# Patient Record
Sex: FEMALE | Race: WHITE | HISPANIC OR LATINO | Employment: PART TIME | URBAN - METROPOLITAN AREA
[De-identification: names, ages, dates, MRNs, and addresses within clinical notes are randomized per-mention and may not be internally consistent; named-entity substitution may affect disease eponyms.]

---

## 2017-02-28 ENCOUNTER — HOSPITAL ENCOUNTER (EMERGENCY)
Facility: MEDICAL CENTER | Age: 22
End: 2017-02-28
Attending: EMERGENCY MEDICINE

## 2017-02-28 VITALS
OXYGEN SATURATION: 98 % | HEART RATE: 89 BPM | WEIGHT: 253.09 LBS | DIASTOLIC BLOOD PRESSURE: 80 MMHG | BODY MASS INDEX: 40.67 KG/M2 | RESPIRATION RATE: 20 BRPM | TEMPERATURE: 98.1 F | HEIGHT: 66 IN | SYSTOLIC BLOOD PRESSURE: 160 MMHG

## 2017-02-28 DIAGNOSIS — J02.0 STREP THROAT: ICD-10-CM

## 2017-02-28 LAB
DEPRECATED S PYO AG THROAT QL EIA: ABNORMAL
SIGNIFICANT IND 70042: ABNORMAL
SITE SITE: ABNORMAL
SOURCE SOURCE: ABNORMAL

## 2017-02-28 PROCEDURE — 87880 STREP A ASSAY W/OPTIC: CPT

## 2017-02-28 PROCEDURE — 700102 HCHG RX REV CODE 250 W/ 637 OVERRIDE(OP): Performed by: EMERGENCY MEDICINE

## 2017-02-28 PROCEDURE — 99284 EMERGENCY DEPT VISIT MOD MDM: CPT

## 2017-02-28 PROCEDURE — A9270 NON-COVERED ITEM OR SERVICE: HCPCS | Performed by: EMERGENCY MEDICINE

## 2017-02-28 RX ORDER — AMOXICILLIN 250 MG/1
500 CAPSULE ORAL ONCE
Status: COMPLETED | OUTPATIENT
Start: 2017-02-28 | End: 2017-02-28

## 2017-02-28 RX ORDER — AMOXICILLIN 500 MG/1
500 CAPSULE ORAL 3 TIMES DAILY
Qty: 30 CAP | Refills: 0 | Status: SHIPPED | OUTPATIENT
Start: 2017-02-28 | End: 2017-03-10

## 2017-02-28 RX ORDER — IBUPROFEN 200 MG
400 TABLET ORAL EVERY 6 HOURS PRN
Status: SHIPPED | COMMUNITY
End: 2023-08-28

## 2017-02-28 RX ORDER — DEXAMETHASONE 4 MG/1
4 TABLET ORAL ONCE
Status: COMPLETED | OUTPATIENT
Start: 2017-02-28 | End: 2017-02-28

## 2017-02-28 RX ADMIN — DEXAMETHASONE 4 MG: 4 TABLET ORAL at 14:00

## 2017-02-28 RX ADMIN — AMOXICILLIN 500 MG: 250 CAPSULE ORAL at 14:00

## 2017-02-28 ASSESSMENT — PAIN SCALES - GENERAL: PAINLEVEL_OUTOF10: 10

## 2017-02-28 NOTE — ED NOTES
"Chief Complaint   Patient presents with   • Sore Throat     started last night   • Abdominal Pain     mid-epigastric, started last night     /85 mmHg  Pulse 89  Temp(Src) 36.7 °C (98.1 °F)  Resp 18  Ht 1.676 m (5' 5.98\")  Wt 114.8 kg (253 lb 1.4 oz)  BMI 40.87 kg/m2  SpO2 95%    "

## 2017-02-28 NOTE — ED AVS SNAPSHOT
[facility] Access Code: [email or web address]  Expires: 3/30/2017  1:31 PM    [facility]  A secure, online tool to manage your health information     [employer]*’s [facility]® is a secure, online tool that connects you to your personalized health information from the privacy of your home -- day or night - making it very easy for you to manage your healthcare. Once the activation process is completed, you can even access your medical information using the [facility] joesph, which is available for free in the Apple Joesph store or Google Play store.     [facility] provides the following levels of access (as shown below):   My Chart Features   [facility] Primary Care Doctor [facility]  Specialists [facility]  Urgent  Care Non-[facility]  Primary Care  Doctor   Email your healthcare team securely and privately 24/7 X X X X   Manage appointments: schedule your next appointment; view details of past/upcoming appointments X      Request prescription refills. X      View recent personal medical records, including lab and immunizations X X X X   View health record, including health history, allergies, medications X X X X   Read reports about your outpatient visits, procedures, consult and ER notes X X X X   See your discharge summary, which is a recap of your hospital and/or ER visit that includes your diagnosis, lab results, and care plan. X X       How to register for [facility]:  1. Go to  [email or web address].  2. Click on the Sign Up Now box, which takes you to the New Member Sign Up page. You will need to provide the following information:  a. Enter your [facility] Access Code exactly as it appears at the top of this page. (You will not need to use this code after you’ve completed the sign-up process. If you do not sign up before the expiration date, you must request a new code.)   b. Enter your date of birth.   c. Enter your home email address.   d. Click Submit, and follow the next screen’s instructions.  3. Create a [facility] ID. This will be your [facility]  login ID and cannot be changed, so think of one that is secure and easy to remember.  4. Create a [employer] password. You can change your password at any time.  5. Enter your Password Reset Question and Answer. This can be used at a later time if you forget your password.   6. Enter your e-mail address. This allows you to receive e-mail notifications when new information is available in [employer].  7. Click Sign Up. You can now view your health information.    For assistance activating your [employer] account, call [phone]

## 2017-02-28 NOTE — DISCHARGE INSTRUCTIONS
"Strep Throat  Strep throat is an infection of the throat caused by a bacteria named Streptococcus pyogenes. Your health care provider may call the infection streptococcal \"tonsillitis\" or \"pharyngitis\" depending on whether there are signs of inflammation in the tonsils or back of the throat. Strep throat is most common in children aged 5-15 years during the cold months of the year, but it can occur in people of any age during any season. This infection is spread from person to person (contagious) through coughing, sneezing, or other close contact.  SIGNS AND SYMPTOMS   · Fever or chills.  · Painful, swollen, red tonsils or throat.  · Pain or difficulty when swallowing.  · White or yellow spots on the tonsils or throat.  · Swollen, tender lymph nodes or \"glands\" of the neck or under the jaw.  · Red rash all over the body (rare).  DIAGNOSIS   Many different infections can cause the same symptoms. A test must be done to confirm the diagnosis so the right treatment can be given. A \"rapid strep test\" can help your health care provider make the diagnosis in a few minutes. If this test is not available, a light swab of the infected area can be used for a throat culture test. If a throat culture test is done, results are usually available in a day or two.  TREATMENT   Strep throat is treated with antibiotic medicine.  HOME CARE INSTRUCTIONS   · Gargle with 1 tsp of salt in 1 cup of warm water, 3-4 times per day or as needed for comfort.  · Family members who also have a sore throat or fever should be tested for strep throat and treated with antibiotics if they have the strep infection.  · Make sure everyone in your household washes their hands well.  · Do not share food, drinking cups, or personal items that could cause the infection to spread to others.  · You may need to eat a soft food diet until your sore throat gets better.  · Drink enough water and fluids to keep your urine clear or pale yellow. This will help prevent " dehydration.  · Get plenty of rest.  · Stay home from school, day care, or work until you have been on antibiotics for 24 hours.  · Take medicines only as directed by your health care provider.  · Take your antibiotic medicine as directed by your health care provider. Finish it even if you start to feel better.  SEEK MEDICAL CARE IF:   · The glands in your neck continue to enlarge.  · You develop a rash, cough, or earache.  · You cough up green, yellow-brown, or bloody sputum.  · You have pain or discomfort not controlled by medicines.  · Your problems seem to be getting worse rather than better.  · You have a fever.  SEEK IMMEDIATE MEDICAL CARE IF:   · You develop any new symptoms such as vomiting, severe headache, stiff or painful neck, chest pain, shortness of breath, or trouble swallowing.  · You develop severe throat pain, drooling, or changes in your voice.  · You develop swelling of the neck, or the skin on the neck becomes red and tender.  · You develop signs of dehydration, such as fatigue, dry mouth, and decreased urination.  · You become increasingly sleepy, or you cannot wake up completely.  MAKE SURE YOU:  · Understand these instructions.  · Will watch your condition.  · Will get help right away if you are not doing well or get worse.     This information is not intended to replace advice given to you by your health care provider. Make sure you discuss any questions you have with your health care provider.     Document Released: 12/15/2001 Document Revised: 01/08/2016 Document Reviewed: 04/11/2016  [employer] Interactive Patient Education ©2016 [employer]

## 2017-02-28 NOTE — ED AVS SNAPSHOT
Home Care Instructions                                                                                                                Mar Orr   MRN: [id number]    Department:  [facility], Emergency Dept   Date of Visit:  2/28/2017            [facility], Emergency Dept    [address] [place] [address]    Phone:  [phone]      You were seen by     Robbie Madden M.D.      Your Diagnosis Was     Strep throat     J02.0       Follow-up Information     1. Follow up with [facility], Emergency Dept.    Specialty:  Emergency Medicine    Why:  If symptoms worsen    Contact information    [address]  [place] [address] [phone]        2. Schedule an appointment as soon as possible for a visit with Miya Luna M.D..    Specialty:  Internal Medicine    Contact information    [address]  [place] [address] [phone]        Medication Information     Review all of your home medications and newly ordered medications with your primary doctor and/or pharmacist as soon as possible. Follow medication instructions as directed by your doctor and/or pharmacist.     Please keep your complete medication list with you and share with your physician. Update the information when medications are discontinued, doses are changed, or new medications (including over-the-counter products) are added; and carry medication information at all times in the event of emergency situations.               Medication List      START taking these medications        Instructions    amoxicillin 500 MG Caps   Commonly known as:  AMOXIL    Take 1 Cap by mouth 3 times a day for 10 days.   Dose:  500 mg         ASK your doctor about these medications        Instructions    * hydrocodone-acetaminophen 5-500 MG Tabs   Commonly known as:  VICODIN    Take 1-2 Tabs by mouth every four hours as needed.   Dose:  1-2 Tab    "      * hydrocodone-acetaminophen 5-325 MG Tabs per tablet   Commonly known as:  NORCO    Take 1-2 Tabs by mouth every four hours as needed.   Dose:  1-2 Tab       Loratadine 10 MG Caps    Take  by mouth.       ondansetron 4 MG Tabs tablet   Commonly known as:  ZOFRAN    Take 1 Tab by mouth every 8 hours as needed for Nausea/Vomiting.   Dose:  4 mg       ondansetron 4 MG Tbdp   Commonly known as:  ZOFRAN ODT    Take 1 Tab by mouth every 8 hours as needed for Nausea/Vomiting. May substitute phenergan 12.5mg if zofran too expensive   Dose:  4 mg       * Notice:  This list has 2 medication(s) that are the same as other medications prescribed for you. Read the directions carefully, and ask your doctor or other care provider to review them with you.            Procedures and tests performed during your visit     RAPID STREP, CULT IF INDICATED (CULTURE IF NEGATIVE)        Discharge Instructions       Strep Throat  Strep throat is an infection of the throat caused by a bacteria named Streptococcus pyogenes. Your health care provider may call the infection streptococcal \"tonsillitis\" or \"pharyngitis\" depending on whether there are signs of inflammation in the tonsils or back of the throat. Strep throat is most common in children aged 5-15 years during the cold months of the year, but it can occur in people of any age during any season. This infection is spread from person to person (contagious) through coughing, sneezing, or other close contact.  SIGNS AND SYMPTOMS   · Fever or chills.  · Painful, swollen, red tonsils or throat.  · Pain or difficulty when swallowing.  · White or yellow spots on the tonsils or throat.  · Swollen, tender lymph nodes or \"glands\" of the neck or under the jaw.  · Red rash all over the body (rare).  DIAGNOSIS   Many different infections can cause the same symptoms. A test must be done to confirm the diagnosis so the right treatment can be given. A \"rapid strep test\" can help your health care " provider make the diagnosis in a few minutes. If this test is not available, a light swab of the infected area can be used for a throat culture test. If a throat culture test is done, results are usually available in a day or two.  TREATMENT   Strep throat is treated with antibiotic medicine.  HOME CARE INSTRUCTIONS   · Gargle with 1 tsp of salt in 1 cup of warm water, 3-4 times per day or as needed for comfort.  · Family members who also have a sore throat or fever should be tested for strep throat and treated with antibiotics if they have the strep infection.  · Make sure everyone in your household washes their hands well.  · Do not share food, drinking cups, or personal items that could cause the infection to spread to others.  · You may need to eat a soft food diet until your sore throat gets better.  · Drink enough water and fluids to keep your urine clear or pale yellow. This will help prevent dehydration.  · Get plenty of rest.  · Stay home from school, day care, or work until you have been on antibiotics for 24 hours.  · Take medicines only as directed by your health care provider.  · Take your antibiotic medicine as directed by your health care provider. Finish it even if you start to feel better.  SEEK MEDICAL CARE IF:   · The glands in your neck continue to enlarge.  · You develop a rash, cough, or earache.  · You cough up green, yellow-brown, or bloody sputum.  · You have pain or discomfort not controlled by medicines.  · Your problems seem to be getting worse rather than better.  · You have a fever.  SEEK IMMEDIATE MEDICAL CARE IF:   · You develop any new symptoms such as vomiting, severe headache, stiff or painful neck, chest pain, shortness of breath, or trouble swallowing.  · You develop severe throat pain, drooling, or changes in your voice.  · You develop swelling of the neck, or the skin on the neck becomes red and tender.  · You develop signs of dehydration, such as fatigue, dry mouth, and  decreased urination.  · You become increasingly sleepy, or you cannot wake up completely.  MAKE SURE YOU:  · Understand these instructions.  · Will watch your condition.  · Will get help right away if you are not doing well or get worse.     This information is not intended to replace advice given to you by your health care provider. Make sure you discuss any questions you have with your health care provider.     Document Released: 12/15/2001 Document Revised: 01/08/2016 Document Reviewed: 04/11/2016  [employer] Interactive Patient Education ©2016 [employer]            Patient Information     Patient Information    Following emergency treatment: all patient requiring follow-up care must return either to a private physician or a clinic if your condition worsens before you are able to obtain further medical attention, please return to the emergency room.     Billing Information    At [facility], we work to make the billing process streamlined for our patients.  Our Representatives are here to answer any questions you may have regarding your hospital bill.  If you have insurance coverage and have supplied your insurance information to us, we will submit a claim to your insurer on your behalf.  Should you have any questions regarding your bill, we can be reached online or by phone as follows:  Online: You are able pay your bills online or live chat with our representatives about any billing questions you may have. We are here to help Monday - Friday from 8:00am to 7:30pm and 9:00am - 12:00pm on Saturdays.  Please visit [email or web address]  for more information.   Phone:  [phone] or [phone]    Please note that your emergency physician, surgeon, pathologist, radiologist, anesthesiologist, and other specialists are not employed by [facility] and will therefore bill separately for their services.  Please contact them directly for any questions concerning their bills at the  numbers below:     Emergency Physician Services:  [phone]  [facility]:  [phone]  [facility]:  [phone]  [facility]:  [phone]    1. Your final bill may vary from the amount quoted upon discharge if all procedures are not complete at that time, or if your doctor has additional procedures of which we are not aware. You will receive an additional bill if you return to the Emergency Department at [facility] for suture removal regardless of the facility of which the sutures were placed.     2. Please arrange for settlement of this account at the emergency registration.    3. All self-pay accounts are due in full at the time of treatment.  If you are unable to meet this obligation then payment is expected within 4-5 days.     4. If you have had radiology studies (CT, X-ray, Ultrasound, MRI), you have received a preliminary result during your emergency department visit. Please contact the radiology department [phone] to receive a copy of your final result. Please discuss the Final result with your primary physician or with the follow up physician provided.     Crisis Hotline:  [place] Crisis Hotline:  9-640-GWTVXAU or 1-793.915.6820  [place] Crisis Hotline:    1-509.218.8731 or 050-091-3511         ED Discharge Follow Up Questions    1. In order to provide you with very good care, we would like to follow up with a phone call in the next few days.  May we have your permission to contact you?     YES /  NO    2. What is the best phone number to call you? (       )_____-__________    3. What is the best time to call you?      Morning  /  Afternoon  /  Evening                   Patient Signature:  ____________________________________________________________    Date:  ____________________________________________________________

## 2017-02-28 NOTE — ED NOTES
"Med rec updated and complete  Allergies reviewed  Pt states \"No prescription medications or vitamins\".  Pt states \"No antibiotics in the last 30 days\".    "

## 2017-02-28 NOTE — ED AVS SNAPSHOT
2/28/2017          Mar Orr  [address] [place] [address]    Dear Mar:    [facility] wants to ensure your discharge home is safe and you or your loved ones have had all your questions answered regarding your care after you leave the hospital.    You may receive a telephone call within two days of your discharge.  This call is to make certain you understand your discharge instructions as well as ensure we provided you with the best care possible during your stay with us.     The call will only last approximately 3-5 minutes and will be done by a nurse.    Once again, we want to ensure your discharge home is safe and that you have a clear understanding of any next steps in your care.  If you have any questions or concerns, please do not hesitate to contact us, we are here for you.  Thank you for choosing [facility] for your healthcare needs.    Sincerely,    Kermit Warren    [facility]

## 2017-02-28 NOTE — ED PROVIDER NOTES
"ED Provider Note    CHIEF COMPLAINT  Chief Complaint   Patient presents with   • Sore Throat     started last night   • Abdominal Pain     mid-epigastric, started last night       HPI  Mar Jeanette Orr is a 21 y.o. female who presents to the emergency department with a chief complaint of sore throat. Patient states the symptoms started last night. She has had a subjective fever. She's not taken her temperature. She says it hurts worse when she swallows. She's had minimal cough and runny nose. No nausea or vomiting. She's had a mild tummy ache.    REVIEW OF SYSTEMS  See HPI for further details. All other systems are negative.     PAST MEDICAL HISTORY  History reviewed. No pertinent past medical history.    FAMILY HISTORY  History reviewed. No pertinent family history.    SOCIAL HISTORY  Social History     Social History   • Marital Status: Single     Spouse Name: N/A   • Number of Children: N/A   • Years of Education: N/A     Social History Main Topics   • Smoking status: Never Smoker    • Smokeless tobacco: Never Used   • Alcohol Use: No   • Drug Use: No   • Sexual Activity: Not Asked     Other Topics Concern   • None     Social History Narrative       SURGICAL HISTORY  History reviewed. No pertinent past surgical history.    CURRENT MEDICATIONS  Home Medications     **Home medications have not yet been reviewed for this encounter**          ALLERGIES  No Known Allergies    PHYSICAL EXAM  VITAL SIGNS: /85 mmHg  Pulse 89  Temp(Src) 36.7 °C (98.1 °F)  Resp 18  Ht 1.676 m (5' 5.98\")  Wt 114.8 kg (253 lb 1.4 oz)  BMI 40.87 kg/m2  SpO2 95%  Constitutional:  Well developed, Well nourished, No acute distress, Non-toxic appearance.   HENT: Normocephalic, oral pharynx is moist, posterior pharynx is erythematous, there is mild bilateral tonsillar swelling. The uvula is midline. There is no peritonsillar abscess.  Eyes: PERRLA, EOMI, Conjunctiva normal, No discharge.   Neck: Normal range of " motion, No tenderness, Supple, No stridor.   Lymphatic: Bilateral anterior cervical adenopathy  Cardiovascular: Normal heart rate, Normal rhythm, No murmurs, No rubs, No gallops.   Thorax & Lungs: Normal breath sounds, No respiratory distress, No wheezing, No chest tenderness.   Skin: Warm, Dry, No erythema, No rash.   Extremities: Intact distal pulses, No edema, No tenderness, No cyanosis, No clubbing.   Neurologic: Alert & oriented x 3, Normal motor function, Normal sensory function, No focal deficits noted.   Psychiatric: Affect normal, Judgment normal, Mood normal.     RADIOLOGY/PROCEDURES    Results for orders placed or performed during the hospital encounter of 02/28/17   RAPID STREP, CULT IF INDICATED (CULTURE IF NEGATIVE)   Result Value Ref Range    Significant Indicator POS (POS)     Source THRT     Site THROAT     Rapid Strep Screen Positive for Group A streptococcus. (A)        COURSE & MEDICAL DECISION MAKING  Pertinent Labs & Imaging studies reviewed. (See chart for details)    The patient presents today with streptococcal pharyngitis. There is no evidence of peritonsillar abscess overall she is well-appearing. She is in outpatient treatment candidate. The patient is treated with Decadron in the emergency department for symptom control. She'll be given a dose of amoxicillin. She is discharged with a prescription for 10 days of amoxicillin.    FINAL IMPRESSION  1. Strep throat            Electronically signed by: Robbie Madden, 2/28/2017 1:28 PM

## 2017-02-28 NOTE — ED NOTES
Dc instructions and prescription given along with note for work per pt request. Pt to f/u with pcp, return for worsening s/s

## 2019-04-23 ENCOUNTER — HOSPITAL ENCOUNTER (EMERGENCY)
Facility: MEDICAL CENTER | Age: 24
End: 2019-04-23
Attending: EMERGENCY MEDICINE

## 2019-04-23 VITALS
RESPIRATION RATE: 16 BRPM | BODY MASS INDEX: 39.97 KG/M2 | SYSTOLIC BLOOD PRESSURE: 146 MMHG | OXYGEN SATURATION: 96 % | HEART RATE: 93 BPM | TEMPERATURE: 98.1 F | WEIGHT: 248.68 LBS | DIASTOLIC BLOOD PRESSURE: 78 MMHG | HEIGHT: 66 IN

## 2019-04-23 DIAGNOSIS — H60.502 ACUTE OTITIS EXTERNA OF LEFT EAR, UNSPECIFIED TYPE: ICD-10-CM

## 2019-04-23 DIAGNOSIS — H66.002 ACUTE SUPPURATIVE OTITIS MEDIA OF LEFT EAR WITHOUT SPONTANEOUS RUPTURE OF TYMPANIC MEMBRANE, RECURRENCE NOT SPECIFIED: ICD-10-CM

## 2019-04-23 LAB — GLUCOSE BLD-MCNC: 86 MG/DL (ref 65–99)

## 2019-04-23 PROCEDURE — 700102 HCHG RX REV CODE 250 W/ 637 OVERRIDE(OP): Performed by: EMERGENCY MEDICINE

## 2019-04-23 PROCEDURE — 99283 EMERGENCY DEPT VISIT LOW MDM: CPT

## 2019-04-23 PROCEDURE — A9270 NON-COVERED ITEM OR SERVICE: HCPCS | Performed by: EMERGENCY MEDICINE

## 2019-04-23 PROCEDURE — 82962 GLUCOSE BLOOD TEST: CPT

## 2019-04-23 RX ORDER — ACETAMINOPHEN 325 MG/1
650 TABLET ORAL ONCE
Status: COMPLETED | OUTPATIENT
Start: 2019-04-23 | End: 2019-04-23

## 2019-04-23 RX ORDER — AMOXICILLIN AND CLAVULANATE POTASSIUM 875; 125 MG/1; MG/1
1 TABLET, FILM COATED ORAL 2 TIMES DAILY
Qty: 20 TAB | Refills: 0 | Status: SHIPPED | OUTPATIENT
Start: 2019-04-23 | End: 2019-05-03

## 2019-04-23 RX ORDER — IBUPROFEN 600 MG/1
600 TABLET ORAL ONCE
Status: COMPLETED | OUTPATIENT
Start: 2019-04-23 | End: 2019-04-23

## 2019-04-23 RX ADMIN — ACETAMINOPHEN 650 MG: 325 TABLET, FILM COATED ORAL at 17:07

## 2019-04-23 RX ADMIN — IBUPROFEN 600 MG: 600 TABLET ORAL at 17:07

## 2019-04-23 NOTE — ED PROVIDER NOTES
"ED Provider Note    CHIEF COMPLAINT  Chief Complaint   Patient presents with   • Ear Pain     left ear       HPI  Jackiei Jeanette Orr is a 23 y.o. female who presents to the emergency department chief complaint of left ear pain.  She has had some nasal congestion on and off for the last 2 weeks and a mild dry cough but no fevers or chills over the last 36 hours she is had worsening left ear pain.  She denies any recent swimming or trauma she denies any discharge from the ear or swelling behind the ear.  She last took Sudafed this morning but states pain is just continued to worsen throughout the day.  Currently the pain is 5 out of 10 on worse when she yawns or sneezes.    REVIEW OF SYSTEMS  Positives as above. Pertinent negatives include ear drainage swelling behind the ear difficulty breathing fevers chills  All other review of systems are negative    PAST MEDICAL HISTORY       SOCIAL HISTORY  Social History     Social History Main Topics   • Smoking status: Never Smoker   • Smokeless tobacco: Never Used   • Alcohol use No   • Drug use: No   • Sexual activity: Not on file       SURGICAL HISTORY  patient denies any surgical history    CURRENT MEDICATIONS  Home Medications     Reviewed by Cindy Underwood R.N. (Registered Nurse) on 04/23/19 at 1649  Med List Status: Partial   Medication Last Dose Status   ibuprofen (MOTRIN) 200 MG Tab  Active                ALLERGIES  No Known Allergies    PHYSICAL EXAM  VITAL SIGNS: /78   Pulse 93   Temp 36.7 °C (98.1 °F) (Temporal)   Resp 16   Ht 1.676 m (5' 6\")   Wt 112.8 kg (248 lb 10.9 oz)   SpO2 96%   BMI 40.14 kg/m²    Pulse ox interpretation: I interpret this pulse ox as normal.  Constitutional: Alert in no apparent distress.  HENT: Normocephalic, Atraumatic, MMM, uvula midline no tonsillar exudates or erythema, no trismus, mild bilateral nasal congestion with turbinate bogginess, right tympanic membrane within normal limits, left tympanic " membrane erythematous and bulging with fluid behind the TM and swelling of the external auditory canal, no mastoid tenderness or swelling bilaterally  Eyes: PERound. Conjunctiva normal, non-icteric.   Heart: Regular rate and rhythm, no murmurs.    Lungs: Clear to auscultation bilaterally. No resp distress, breath sounds equal  Skin: Warm, Dry, No erythema, No rash.   Neurologic: Alert and oriented, Grossly non-focal.       DIFFERENTIAL DIAGNOSIS AND WORK UP PLAN    This is a 23 y.o. female who presents with evidence of at least otitis media and possible early otitis externa of the left ear, Accu-Cheks within normal limits she has not had any trauma to the area she been swimming with however we will treat her with both oral and otic drops.  She will treat with ibuprofen and Tylenol here for pain we discussed combos in the ear as well as warm compresses to help with discomfort at home.  She understands feels comfortable going home      The patient will return for new or worsening symptoms and is stable at the time of discharge.    The patient is referred to a primary physician for blood pressure management, diabetic screening, and for all other preventative health concerns.    DISPOSITION:  Patient will be discharged home in stable condition.    FOLLOW UP:  Miya Luna M.D.  [address] [address] [phone]    Schedule an appointment as soon as possible for a visit   for blood pressure recheck    [facility], Emergency Dept  [address] [facility] [address] [phone]    If symptoms worsen      OUTPATIENT MEDICATIONS:  New Prescriptions    AMOXICILLIN-CLAVULANATE (AUGMENTIN) 875-125 MG TAB    Take 1 Tab by mouth 2 times a day for 10 days.    NEOMYCIN SULF/POLYMYX B SULF/HC SOLN (CORTISPORIN HC SOL) 3.5-31208-7 SOLUTION    Place 3 Drops in left ear 4 times a day for 7 days.           FINAL IMPRESSION  1. Acute suppurative otitis media of left ear without  spontaneous rupture of tympanic membrane, recurrence not specified    2. Acute otitis externa of left ear, unspecified type                 Electronically signed by: Christina Acevedo, 4/23/2019 4:55 PM    This dictation has been created using voice recognition software and/or scribes. The accuracy of the dictation is limited by the abilities of the software and the expertise of the scribes. I expect there may be some errors of grammar and possibly content. I made every attempt to manually correct the errors within my dictation. However, errors related to voice recognition software and/or scribes may still exist and should be interpreted within the appropriate context.

## 2019-04-23 NOTE — ED TRIAGE NOTES
"Chief Complaint   Patient presents with   • Ear Pain     left ear     Pt reports that she woke with ear pain to left ear. Reports white discharge from ear this morning and feeling clogged.  /78   Pulse 93   Temp 36.7 °C (98.1 °F) (Temporal)   Resp 16   Ht 1.676 m (5' 6\")   Wt 112.8 kg (248 lb 10.9 oz)   SpO2 96%   Pt informed of wait times. Educated on triage process.  Asked to return to triage RN for any new or worsening of symptoms. Thanked for patience.        "

## 2020-08-24 ENCOUNTER — APPOINTMENT (OUTPATIENT)
Dept: PHYSICAL THERAPY | Facility: REHABILITATION | Age: 25
End: 2020-08-24
Attending: NURSE PRACTITIONER
Payer: COMMERCIAL

## 2020-09-08 ENCOUNTER — APPOINTMENT (OUTPATIENT)
Dept: PHYSICAL THERAPY | Facility: REHABILITATION | Age: 25
End: 2020-09-08
Attending: NURSE PRACTITIONER
Payer: COMMERCIAL

## 2020-09-14 ENCOUNTER — PHYSICAL THERAPY (OUTPATIENT)
Dept: PHYSICAL THERAPY | Facility: REHABILITATION | Age: 25
End: 2020-09-14
Attending: NURSE PRACTITIONER
Payer: COMMERCIAL

## 2020-09-14 DIAGNOSIS — M54.50 LOW BACK PAIN, UNSPECIFIED BACK PAIN LATERALITY, UNSPECIFIED CHRONICITY, UNSPECIFIED WHETHER SCIATICA PRESENT: ICD-10-CM

## 2020-09-14 PROCEDURE — 97110 THERAPEUTIC EXERCISES: CPT

## 2020-09-14 PROCEDURE — 97162 PT EVAL MOD COMPLEX 30 MIN: CPT

## 2020-09-14 ASSESSMENT — ENCOUNTER SYMPTOMS
EXACERBATED BY: SNEEZING
PAIN SCALE AT LOWEST: 0
EXACERBATED BY: SITTING
QUALITY: ACHING
PAIN SCALE: 0
EXACERBATED BY: SQUATTING
PAIN SCALE AT HIGHEST: 10
EXACERBATED BY: HOUSEWORK
PAIN TIMING: INTERMITTENT
QUALITY: TIGHTNESS
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
## 2020-09-21 ENCOUNTER — PHYSICAL THERAPY (OUTPATIENT)
Dept: PHYSICAL THERAPY | Facility: REHABILITATION | Age: 25
End: 2020-09-21
Attending: NURSE PRACTITIONER
Payer: COMMERCIAL

## 2020-09-21 DIAGNOSIS — M54.50 LOW BACK PAIN, UNSPECIFIED BACK PAIN LATERALITY, UNSPECIFIED CHRONICITY, UNSPECIFIED WHETHER SCIATICA PRESENT: ICD-10-CM

## 2020-09-21 PROCEDURE — 97014 ELECTRIC STIMULATION THERAPY: CPT

## 2020-09-21 PROCEDURE — 97110 THERAPEUTIC EXERCISES: CPT

## 2020-09-21 NOTE — OP THERAPY DAILY TREATMENT
Outpatient Physical Therapy  DAILY TREATMENT     [facility] Outpatient Physical Therapy [place] [address] [facility], [address]  [place] [address]  Phone:  [phone]    Date: 09/21/2020    Patient: Mar Orr  YOB: 1995  MRN: [id number]     Time Calculation    Start time: 1530  Stop time: 1620 Time Calculation (min): 50 minutes         Chief Complaint: No chief complaint on file.    Visit #: 2    SUBJECTIVE:  Exercises made symptoms worse    OBJECTIVE:  Current objective measures:  Decrease//abolish after shawn pose stretch/rocking back on heels  in quadraped          Therapeutic Exercises (CPT 84872):     1. TA activation, 10 x 10 sec    2. Bridge, 10 x 10 sec    3. Posture re ed, neutral spine using slouch overcorrect    4. BKFO pressure biofeedback, x 15    5. Ball roll with TA stabilization pressure biofeedback, x 15    6. Shawn pose/rocking in quadraped onto heels, x 20    Therapeutic Treatments and Modalities:     1. E Stim Unattended (CPT 22214), IFC with MHP lumbar spine in SEIL    Time-based treatments/modalities:    Physical Therapy Timed Treatment Charges  Therapeutic exercise minutes (CPT 89032): 27 minutes    ASSESSMENT:   Response to treatment: decreased/impaired core stability right compared to left. Continue with posterior chain and lumbar stab next session    PLAN/RECOMMENDATIONS:   Plan for treatment: therapy treatment to continue next visit.  Planned interventions for next visit: continue with current treatment.

## 2020-09-24 ENCOUNTER — APPOINTMENT (OUTPATIENT)
Dept: PHYSICAL THERAPY | Facility: REHABILITATION | Age: 25
End: 2020-09-24
Attending: NURSE PRACTITIONER
Payer: COMMERCIAL

## 2020-09-24 NOTE — OP THERAPY DAILY TREATMENT
"  Outpatient Physical Therapy  DAILY TREATMENT     [facility] Outpatient Physical Therapy [address], [address]  [place] [address]  Phone:  [phone]    Date: 09/24/2020    Patient: Mar Orr  YOB: 1995  MRN: [id number]     Time Calculation                   Chief Complaint: No chief complaint on file.    Visit #: 3    SUBJECTIVE:  ***    OBJECTIVE:  Current objective measures: ***          Therapeutic Exercises (CPT 35764):     1. TA activation, 10 x 10 sec    2. Bridge, 10 x 10 sec    3. Posture re ed, neutral spine using slouch overcorrect    4. BKFO pressure biofeedback, x 15    5. Ball roll with TA stabilization pressure biofeedback, x 15    6. Leon pose/rocking in quadraped onto heels, x 20    Therapeutic Treatments and Modalities:     1. E Stim Unattended (CPT 64325), IFC with MHP lumbar spine in SEIL    Time-based treatments/modalities:           Pain rating (1-10) before treatment:  {PAIN NUMBERS_1-10:89059}  Pain rating (1-10) after treatment:  {PAIN NUMBERS_1-10:93295}    ASSESSMENT:   Response to treatment: ***    PLAN/RECOMMENDATIONS:   Plan for treatment: {AMB OP THERAPY - THERAPY PLAN:142683332::\"therapy treatment to continue next visit\"}.  Planned interventions for next visit: {PT PLANNED THERAPY INTERVENTIONS:703066295}.       "

## 2020-09-28 ENCOUNTER — APPOINTMENT (OUTPATIENT)
Dept: PHYSICAL THERAPY | Facility: REHABILITATION | Age: 25
End: 2020-09-28
Attending: NURSE PRACTITIONER
Payer: COMMERCIAL

## 2020-10-01 ENCOUNTER — PHYSICAL THERAPY (OUTPATIENT)
Dept: PHYSICAL THERAPY | Facility: REHABILITATION | Age: 25
End: 2020-10-01
Attending: NURSE PRACTITIONER
Payer: COMMERCIAL

## 2020-10-01 DIAGNOSIS — M54.50 LOW BACK PAIN, UNSPECIFIED BACK PAIN LATERALITY, UNSPECIFIED CHRONICITY, UNSPECIFIED WHETHER SCIATICA PRESENT: ICD-10-CM

## 2020-10-01 PROCEDURE — 97014 ELECTRIC STIMULATION THERAPY: CPT

## 2020-10-01 PROCEDURE — 97110 THERAPEUTIC EXERCISES: CPT

## 2020-10-01 PROCEDURE — 97112 NEUROMUSCULAR REEDUCATION: CPT

## 2020-10-01 NOTE — OP THERAPY DAILY TREATMENT
Outpatient Physical Therapy  DAILY TREATMENT     [facility] Outpatient Physical Therapy [address] [facility], [address]  [place] [address]  Phone:  [phone]    Date: 10/01/2020    Patient: Mar Orr  YOB: 1995  MRN: [id number]     Time Calculation    Start time: 1530  Stop time: 1620 Time Calculation (min): 50 minutes         Chief Complaint: No chief complaint on file.    Visit #: 3    SUBJECTIVE:  >50% better exercises seems to work currently no pain    OBJECTIVE:  Current objective measures:           Therapeutic Exercises (CPT 64626):     1. TA activation, 10 x 10 sec    2. Bridge, 10 x 10 sec    3. Posture re ed, neutral spine using slouch overcorrect    4. BKFO pressure biofeedback, x 15    5. Ball roll with TA stabilization pressure biofeedback, x 15    6. Leon pose/rocking in quadraped onto heels, x 20    Therapeutic Treatments and Modalities:     1. E Stim Unattended (CPT 03492), IFC with MHP lumbar spine in supine    2. Neuromuscular Re-education (CPT 44827), hip hinge with neutral spine; xviqns-eakkuyrtodk-jwlteha neutral spine    Time-based treatments/modalities:    Physical Therapy Timed Treatment Charges  Neuromusc re-ed, balance, coor, post minutes (CPT 59828): 8 minutes  Therapeutic exercise minutes (CPT 17814): 20 minutes      ASSESSMENT:   Response to treatment: asymptomatic throughout, good understanding/compliance of maintaining neutral spine with functional movement    PLAN/RECOMMENDATIONS:   Plan for treatment: therapy treatment to continue next visit.  Planned interventions for next visit: continue with current treatment.

## 2020-10-05 ENCOUNTER — APPOINTMENT (OUTPATIENT)
Dept: PHYSICAL THERAPY | Facility: REHABILITATION | Age: 25
End: 2020-10-05
Attending: NURSE PRACTITIONER
Payer: COMMERCIAL

## 2020-10-08 ENCOUNTER — APPOINTMENT (OUTPATIENT)
Dept: PHYSICAL THERAPY | Facility: REHABILITATION | Age: 25
End: 2020-10-08
Attending: NURSE PRACTITIONER
Payer: COMMERCIAL

## 2020-10-16 ENCOUNTER — APPOINTMENT (OUTPATIENT)
Dept: PHYSICAL THERAPY | Facility: REHABILITATION | Age: 25
End: 2020-10-16
Attending: NURSE PRACTITIONER
Payer: COMMERCIAL

## 2021-02-08 ENCOUNTER — TELEPHONE (OUTPATIENT)
Dept: SCHEDULING | Facility: IMAGING CENTER | Age: 26
End: 2021-02-08

## 2021-03-23 ENCOUNTER — OFFICE VISIT (OUTPATIENT)
Dept: MEDICAL GROUP | Facility: LAB | Age: 26
End: 2021-03-23
Payer: COMMERCIAL

## 2021-03-23 VITALS
HEIGHT: 67 IN | TEMPERATURE: 98 F | HEART RATE: 80 BPM | SYSTOLIC BLOOD PRESSURE: 126 MMHG | WEIGHT: 256 LBS | BODY MASS INDEX: 40.18 KG/M2 | OXYGEN SATURATION: 99 % | DIASTOLIC BLOOD PRESSURE: 72 MMHG

## 2021-03-23 DIAGNOSIS — Z76.89 ESTABLISHING CARE WITH NEW DOCTOR, ENCOUNTER FOR: ICD-10-CM

## 2021-03-23 DIAGNOSIS — E66.01 MORBID OBESITY (HCC): ICD-10-CM

## 2021-03-23 DIAGNOSIS — M54.9 CHRONIC BACK PAIN, UNSPECIFIED BACK LOCATION, UNSPECIFIED BACK PAIN LATERALITY: ICD-10-CM

## 2021-03-23 DIAGNOSIS — M25.369 INSTABILITY OF KNEE JOINT, UNSPECIFIED LATERALITY: ICD-10-CM

## 2021-03-23 DIAGNOSIS — G47.39 SLEEP APNEA-LIKE BEHAVIOR: ICD-10-CM

## 2021-03-23 DIAGNOSIS — Z00.00 HEALTH CARE MAINTENANCE: ICD-10-CM

## 2021-03-23 DIAGNOSIS — G89.29 CHRONIC BACK PAIN, UNSPECIFIED BACK LOCATION, UNSPECIFIED BACK PAIN LATERALITY: ICD-10-CM

## 2021-03-23 DIAGNOSIS — J45.20 MILD INTERMITTENT ASTHMA WITHOUT COMPLICATION: ICD-10-CM

## 2021-03-23 PROCEDURE — 99204 OFFICE O/P NEW MOD 45 MIN: CPT | Performed by: FAMILY MEDICINE

## 2021-03-23 RX ORDER — ALBUTEROL SULFATE 90 UG/1
2 AEROSOL, METERED RESPIRATORY (INHALATION) EVERY 4 HOURS PRN
Qty: 1 EACH | Refills: 6 | Status: SHIPPED | OUTPATIENT
Start: 2021-03-23 | End: 2023-08-28

## 2021-03-23 RX ORDER — EPINEPHRINE 0.3 MG/.3ML
1 INJECTION SUBCUTANEOUS
COMMUNITY
Start: 2021-01-28 | End: 2023-08-28

## 2021-03-23 ASSESSMENT — PATIENT HEALTH QUESTIONNAIRE - PHQ9: CLINICAL INTERPRETATION OF PHQ2 SCORE: 0
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
## 2021-04-05 ENCOUNTER — HOSPITAL ENCOUNTER (OUTPATIENT)
Dept: RADIOLOGY | Facility: MEDICAL CENTER | Age: 26
End: 2021-04-05
Attending: FAMILY MEDICINE
Payer: COMMERCIAL

## 2021-04-05 ENCOUNTER — HOSPITAL ENCOUNTER (OUTPATIENT)
Dept: LAB | Facility: MEDICAL CENTER | Age: 26
End: 2021-04-05
Attending: FAMILY MEDICINE
Payer: COMMERCIAL

## 2021-04-05 DIAGNOSIS — G89.29 CHRONIC BACK PAIN, UNSPECIFIED BACK LOCATION, UNSPECIFIED BACK PAIN LATERALITY: ICD-10-CM

## 2021-04-05 DIAGNOSIS — M54.9 CHRONIC BACK PAIN, UNSPECIFIED BACK LOCATION, UNSPECIFIED BACK PAIN LATERALITY: ICD-10-CM

## 2021-04-05 DIAGNOSIS — E66.01 MORBID OBESITY (HCC): ICD-10-CM

## 2021-04-05 DIAGNOSIS — Z76.89 ESTABLISHING CARE WITH NEW DOCTOR, ENCOUNTER FOR: ICD-10-CM

## 2021-04-05 DIAGNOSIS — M25.369 INSTABILITY OF KNEE JOINT, UNSPECIFIED LATERALITY: ICD-10-CM

## 2021-04-05 LAB
ALBUMIN SERPL BCP-MCNC: 4.1 G/DL (ref 3.2–4.9)
ALBUMIN/GLOB SERPL: 1.4 G/DL
ALP SERPL-CCNC: 103 U/L (ref 30–99)
ALT SERPL-CCNC: 14 U/L (ref 2–50)
ANION GAP SERPL CALC-SCNC: 7 MMOL/L (ref 7–16)
AST SERPL-CCNC: 13 U/L (ref 12–45)
BASOPHILS # BLD AUTO: 0.7 % (ref 0–1.8)
BASOPHILS # BLD: 0.06 K/UL (ref 0–0.12)
BILIRUB SERPL-MCNC: 0.4 MG/DL (ref 0.1–1.5)
BUN SERPL-MCNC: 11 MG/DL (ref 8–22)
CALCIUM SERPL-MCNC: 9.3 MG/DL (ref 8.5–10.5)
CHLORIDE SERPL-SCNC: 105 MMOL/L (ref 96–112)
CHOLEST SERPL-MCNC: 159 MG/DL (ref 100–199)
CO2 SERPL-SCNC: 24 MMOL/L (ref 20–33)
CREAT SERPL-MCNC: 0.5 MG/DL (ref 0.5–1.4)
EOSINOPHIL # BLD AUTO: 0.61 K/UL (ref 0–0.51)
EOSINOPHIL NFR BLD: 7.1 % (ref 0–6.9)
ERYTHROCYTE [DISTWIDTH] IN BLOOD BY AUTOMATED COUNT: 36.7 FL (ref 35.9–50)
FASTING STATUS PATIENT QL REPORTED: NORMAL
GLOBULIN SER CALC-MCNC: 3 G/DL (ref 1.9–3.5)
GLUCOSE SERPL-MCNC: 91 MG/DL (ref 65–99)
HCT VFR BLD AUTO: 42.8 % (ref 37–47)
HDLC SERPL-MCNC: 40 MG/DL
HGB BLD-MCNC: 14.4 G/DL (ref 12–16)
IMM GRANULOCYTES # BLD AUTO: 0.02 K/UL (ref 0–0.11)
IMM GRANULOCYTES NFR BLD AUTO: 0.2 % (ref 0–0.9)
LDLC SERPL CALC-MCNC: 96 MG/DL
LYMPHOCYTES # BLD AUTO: 2.67 K/UL (ref 1–4.8)
LYMPHOCYTES NFR BLD: 31.1 % (ref 22–41)
MCH RBC QN AUTO: 28.7 PG (ref 27–33)
MCHC RBC AUTO-ENTMCNC: 33.6 G/DL (ref 33.6–35)
MCV RBC AUTO: 85.4 FL (ref 81.4–97.8)
MONOCYTES # BLD AUTO: 0.48 K/UL (ref 0–0.85)
MONOCYTES NFR BLD AUTO: 5.6 % (ref 0–13.4)
NEUTROPHILS # BLD AUTO: 4.74 K/UL (ref 2–7.15)
NEUTROPHILS NFR BLD: 55.3 % (ref 44–72)
NRBC # BLD AUTO: 0 K/UL
NRBC BLD-RTO: 0 /100 WBC
PLATELET # BLD AUTO: 280 K/UL (ref 164–446)
PMV BLD AUTO: 10.5 FL (ref 9–12.9)
POTASSIUM SERPL-SCNC: 3.8 MMOL/L (ref 3.6–5.5)
PROT SERPL-MCNC: 7.1 G/DL (ref 6–8.2)
RBC # BLD AUTO: 5.01 M/UL (ref 4.2–5.4)
SODIUM SERPL-SCNC: 136 MMOL/L (ref 135–145)
TRIGL SERPL-MCNC: 117 MG/DL (ref 0–149)
WBC # BLD AUTO: 8.6 K/UL (ref 4.8–10.8)

## 2021-04-05 PROCEDURE — 82306 VITAMIN D 25 HYDROXY: CPT

## 2021-04-05 PROCEDURE — 80061 LIPID PANEL: CPT

## 2021-04-05 PROCEDURE — 83036 HEMOGLOBIN GLYCOSYLATED A1C: CPT

## 2021-04-05 PROCEDURE — 85025 COMPLETE CBC W/AUTO DIFF WBC: CPT

## 2021-04-05 PROCEDURE — 80053 COMPREHEN METABOLIC PANEL: CPT

## 2021-04-05 PROCEDURE — 84443 ASSAY THYROID STIM HORMONE: CPT

## 2021-04-05 PROCEDURE — 73565 X-RAY EXAM OF KNEES: CPT

## 2021-04-05 PROCEDURE — 36415 COLL VENOUS BLD VENIPUNCTURE: CPT

## 2021-04-05 PROCEDURE — 72100 X-RAY EXAM L-S SPINE 2/3 VWS: CPT

## 2021-04-05 PROCEDURE — 84439 ASSAY OF FREE THYROXINE: CPT

## 2021-04-06 LAB
T4 FREE SERPL-MCNC: 1.34 NG/DL (ref 0.93–1.7)
TSH SERPL DL<=0.005 MIU/L-ACNC: 2.95 UIU/ML (ref 0.38–5.33)

## 2021-04-07 LAB
25(OH)D3 SERPL-MCNC: 9 NG/ML (ref 30–80)
EST. AVERAGE GLUCOSE BLD GHB EST-MCNC: 108 MG/DL
HBA1C MFR BLD: 5.4 % (ref 4–5.6)

## 2021-04-23 ENCOUNTER — OFFICE VISIT (OUTPATIENT)
Dept: MEDICAL GROUP | Facility: LAB | Age: 26
End: 2021-04-23
Payer: COMMERCIAL

## 2021-04-23 VITALS
TEMPERATURE: 97.9 F | HEIGHT: 67 IN | SYSTOLIC BLOOD PRESSURE: 120 MMHG | OXYGEN SATURATION: 98 % | HEART RATE: 78 BPM | BODY MASS INDEX: 39.39 KG/M2 | DIASTOLIC BLOOD PRESSURE: 74 MMHG | RESPIRATION RATE: 14 BRPM | WEIGHT: 251 LBS

## 2021-04-23 DIAGNOSIS — E66.01 MORBID OBESITY (HCC): ICD-10-CM

## 2021-04-23 DIAGNOSIS — R79.89 LOW VITAMIN D LEVEL: ICD-10-CM

## 2021-04-23 DIAGNOSIS — S83.006A PATELLAR DISLOCATION, UNSPECIFIED LATERALITY, INITIAL ENCOUNTER: ICD-10-CM

## 2021-04-23 PROCEDURE — 99214 OFFICE O/P EST MOD 30 MIN: CPT | Performed by: FAMILY MEDICINE

## 2021-04-23 RX ORDER — ERGOCALCIFEROL 1.25 MG/1
50000 CAPSULE ORAL
Qty: 12 CAPSULE | Refills: 1 | Status: SHIPPED | OUTPATIENT
Start: 2021-04-23 | End: 2023-08-28

## 2021-04-23 RX ORDER — PREDNISONE 10 MG/1
TABLET ORAL
COMMUNITY
Start: 2021-04-12 | End: 2023-08-28

## 2021-04-23 ASSESSMENT — FIBROSIS 4 INDEX: FIB4 SCORE: 0.29

## 2021-04-23 NOTE — PROGRESS NOTES
Chief Complaint:   Chief Complaint   Patient presents with   • Results     1 month follow up for lab and imaging results       HPI: Established patient  Mar Orr is a 25 y.o. female who presents for follow-up on labs and x-ray discussed the following today:    1. Patellar dislocation, unspecified laterality, initial encounter  Patient had chronic bilateral knee pain, x-ray showed evidence of bilateral patellar dislocation.  Patient did not follow-up with orthopedics yet.  She has the pain is chronic pain and there is no new concerns.  Also concerns about chronic back pain.  Without evidence of red flags    2. Low vitamin D level  Very low levels of vitamin D at the level of 9.  Patient not taking any supplementation at this time    3. Morbid obesity   Morbid obesity with a BMI around 39, patient is interested in a referral for weight loss surgery exploration.  Requesting to place a referral today.          Past medical history, family history, social history and medications reviewed and updated in the record.  Today  Current medications, problem list and allergies reviewed in EPIC today  Health maintenance topics are reviewed and updated.    Patient Active Problem List    Diagnosis Date Noted   • Patellar dislocation 04/23/2021   • Establishing care with new doctor, encounter for 03/23/2021   • Health care maintenance 03/23/2021   • Mild intermittent asthma without complication 03/23/2021   • Morbid obesity (HCC) 03/23/2021     Family History   Problem Relation Age of Onset   • Arthritis Mother    • No Known Problems Father    • Lung Disease Brother         asthma    • Diabetes Maternal Grandfather    • Cancer Maternal Grandfather         prostate ca      Social History     Socioeconomic History   • Marital status: Single     Spouse name: Not on file   • Number of children: Not on file   • Years of education: Not on file   • Highest education level: Not on file   Occupational History      Comment: works at [employer]    Tobacco Use   • Smoking status: Never Smoker   • Smokeless tobacco: Never Used   Substance and Sexual Activity   • Alcohol use: No   • Drug use: Not Currently   • Sexual activity: Yes     Partners: Male   Other Topics Concern   • Not on file   Social History Narrative   • Not on file     Social Determinants of Health     Financial Resource Strain:    • Difficulty of Paying Living Expenses:    Food Insecurity:    • Worried About Running Out of Food in the Last Year:    • Ran Out of Food in the Last Year:    Transportation Needs:    • Lack of Transportation (Medical):    • Lack of Transportation (Non-Medical):    Physical Activity:    • Days of Exercise per Week:    • Minutes of Exercise per Session:    Stress:    • Feeling of Stress :    Social Connections:    • Frequency of Communication with Friends and Family:    • Frequency of Social Gatherings with Friends and Family:    • Attends [employer] Services:    • Active Member of Clubs or Organizations:    • Attends Club or Organization Meetings:    • Marital Status:    Intimate Partner Violence:    • Fear of Current or Ex-Partner:    • Emotionally Abused:    • Physically Abused:    • Sexually Abused:      Current Outpatient Medications   Medication Sig Dispense Refill   • TRELEGY ELLIPTA 100-62.5-25 MCG/INH AEROSOL POWDER, BREATH ACTIVATED      • ergocalciferol (DRISDOL) 92750 UNIT capsule Take 1 capsule by mouth every 7 days. 12 capsule 1   • EPINEPHrine (EPIPEN) 0.3 MG/0.3ML Solution Auto-injector solution for injection Inject 1 mL under the skin 1 time a day as needed.     • albuterol 108 (90 Base) MCG/ACT Aero Soln inhalation aerosol Inhale 2 Puffs every four hours as needed for Shortness of Breath. 1 Each 6   • ibuprofen (MOTRIN) 200 MG Tab Take 400 mg by mouth every 6 hours as needed for Mild Pain.     • predniSONE (DELTASONE) 10 MG Tab        No current facility-administered medications for this visit.           Review Of  "Systems  As documented in HPI above  PHYSICAL EXAMINATION:    /74   Pulse 78   Temp 36.6 °C (97.9 °F) (Temporal)   Resp 14   Ht 1.702 m (5' 7\")   Wt 114 kg (251 lb)   SpO2 98%   BMI 39.31 kg/m²   Gen.: Well-developed, well-nourished, no apparent distress, pleasant and cooperative with the examination  HEENT: Normocephalic/atraumatic,  Neck: No JVD or bruits, no adenopathy  Cor: Regular rate and rhythm without murmur gallop or rub  Lungs: Clear to auscultation with equal breath sounds bilaterally. No wheezes, rhonchi.  Abdomen: Soft nontender without hepatosplenomegaly or masses appreciated, normoactive bowel sounds  Extremities: No cyanosis, clubbing or edema          ASSESSMENT/Plan:  1. Patellar dislocation, unspecified laterality, initial encounter   chronic pain, x-ray shows evidence of bilateral dislocation of the patella bilateral.  Advised to follow-up with orthopedics as directed  REFERRAL TO ORTHOPEDICS   2. Low vitamin D level   most likely chronic problem.    Prescribed high-dose vitamin D for at least 12 weeks before rechecking the level    ergocalciferol (DRISDOL) 06878 UNIT capsule   3. Morbid obesity (HCC)   chronic and resolved, patient is interested in bariatric surgery evaluation.  REFERRAL TO BARIATRIC SURGERY       Please note that this dictation was created using voice recognition software. I have made every reasonable attempt to correct obvious errors but there may be errors of grammar and content that I may have overlooked prior to finalization of this note.       "

## 2022-08-07 ENCOUNTER — APPOINTMENT (OUTPATIENT)
Dept: RADIOLOGY | Facility: MEDICAL CENTER | Age: 27
End: 2022-08-07
Attending: EMERGENCY MEDICINE
Payer: MEDICAID

## 2022-08-07 ENCOUNTER — HOSPITAL ENCOUNTER (EMERGENCY)
Facility: MEDICAL CENTER | Age: 27
End: 2022-08-07
Attending: EMERGENCY MEDICINE
Payer: MEDICAID

## 2022-08-07 VITALS
OXYGEN SATURATION: 98 % | TEMPERATURE: 97.7 F | BODY MASS INDEX: 26.47 KG/M2 | SYSTOLIC BLOOD PRESSURE: 106 MMHG | WEIGHT: 168.65 LBS | HEART RATE: 64 BPM | HEIGHT: 67 IN | DIASTOLIC BLOOD PRESSURE: 64 MMHG | RESPIRATION RATE: 15 BRPM

## 2022-08-07 DIAGNOSIS — O46.8X1 SUBCHORIONIC HEMORRHAGE OF PLACENTA IN FIRST TRIMESTER, SINGLE OR UNSPECIFIED FETUS: ICD-10-CM

## 2022-08-07 DIAGNOSIS — O23.41 URINARY TRACT INFECTION IN MOTHER DURING FIRST TRIMESTER OF PREGNANCY: ICD-10-CM

## 2022-08-07 DIAGNOSIS — O41.8X10 SUBCHORIONIC HEMORRHAGE OF PLACENTA IN FIRST TRIMESTER, SINGLE OR UNSPECIFIED FETUS: ICD-10-CM

## 2022-08-07 DIAGNOSIS — O20.0 THREATENED MISCARRIAGE IN EARLY PREGNANCY: ICD-10-CM

## 2022-08-07 LAB
ALBUMIN SERPL BCP-MCNC: 4.2 G/DL (ref 3.2–4.9)
ALBUMIN/GLOB SERPL: 1.6 G/DL
ALP SERPL-CCNC: 76 U/L (ref 30–99)
ALT SERPL-CCNC: 15 U/L (ref 2–50)
ANION GAP SERPL CALC-SCNC: 14 MMOL/L (ref 7–16)
APPEARANCE UR: CLEAR
AST SERPL-CCNC: 15 U/L (ref 12–45)
B-HCG SERPL-ACNC: ABNORMAL MIU/ML (ref 0–5)
BACTERIA #/AREA URNS HPF: ABNORMAL /HPF
BASOPHILS # BLD AUTO: 0.3 % (ref 0–1.8)
BASOPHILS # BLD: 0.03 K/UL (ref 0–0.12)
BILIRUB SERPL-MCNC: 0.9 MG/DL (ref 0.1–1.5)
BILIRUB UR QL STRIP.AUTO: NEGATIVE
BUN SERPL-MCNC: 11 MG/DL (ref 8–22)
CALCIUM SERPL-MCNC: 9.5 MG/DL (ref 8.5–10.5)
CHLORIDE SERPL-SCNC: 105 MMOL/L (ref 96–112)
CO2 SERPL-SCNC: 19 MMOL/L (ref 20–33)
COLOR UR: YELLOW
CREAT SERPL-MCNC: 0.42 MG/DL (ref 0.5–1.4)
EOSINOPHIL # BLD AUTO: 0.11 K/UL (ref 0–0.51)
EOSINOPHIL NFR BLD: 1 % (ref 0–6.9)
EPI CELLS #/AREA URNS HPF: ABNORMAL /HPF
ERYTHROCYTE [DISTWIDTH] IN BLOOD BY AUTOMATED COUNT: 38.5 FL (ref 35.9–50)
GFR SERPLBLD CREATININE-BSD FMLA CKD-EPI: 139 ML/MIN/1.73 M 2
GLOBULIN SER CALC-MCNC: 2.7 G/DL (ref 1.9–3.5)
GLUCOSE SERPL-MCNC: 89 MG/DL (ref 65–99)
GLUCOSE UR STRIP.AUTO-MCNC: NEGATIVE MG/DL
HCT VFR BLD AUTO: 41.2 % (ref 37–47)
HGB BLD-MCNC: 14.7 G/DL (ref 12–16)
HYALINE CASTS #/AREA URNS LPF: ABNORMAL /LPF
IMM GRANULOCYTES # BLD AUTO: 0.04 K/UL (ref 0–0.11)
IMM GRANULOCYTES NFR BLD AUTO: 0.4 % (ref 0–0.9)
KETONES UR STRIP.AUTO-MCNC: >=80 MG/DL
LEUKOCYTE ESTERASE UR QL STRIP.AUTO: NEGATIVE
LYMPHOCYTES # BLD AUTO: 2.57 K/UL (ref 1–4.8)
LYMPHOCYTES NFR BLD: 23.5 % (ref 22–41)
MCH RBC QN AUTO: 31.3 PG (ref 27–33)
MCHC RBC AUTO-ENTMCNC: 35.7 G/DL (ref 33.6–35)
MCV RBC AUTO: 87.8 FL (ref 81.4–97.8)
MICRO URNS: ABNORMAL
MONOCYTES # BLD AUTO: 0.47 K/UL (ref 0–0.85)
MONOCYTES NFR BLD AUTO: 4.3 % (ref 0–13.4)
NEUTROPHILS # BLD AUTO: 7.71 K/UL (ref 2–7.15)
NEUTROPHILS NFR BLD: 70.5 % (ref 44–72)
NITRITE UR QL STRIP.AUTO: NEGATIVE
NRBC # BLD AUTO: 0 K/UL
NRBC BLD-RTO: 0 /100 WBC
NUMBER OF RH DOSES IND 8505RD: NORMAL
PH UR STRIP.AUTO: 6 [PH] (ref 5–8)
PLATELET # BLD AUTO: 255 K/UL (ref 164–446)
PMV BLD AUTO: 9.9 FL (ref 9–12.9)
POTASSIUM SERPL-SCNC: 3.6 MMOL/L (ref 3.6–5.5)
PROT SERPL-MCNC: 6.9 G/DL (ref 6–8.2)
PROT UR QL STRIP: NEGATIVE MG/DL
RBC # BLD AUTO: 4.69 M/UL (ref 4.2–5.4)
RBC # URNS HPF: ABNORMAL /HPF
RBC UR QL AUTO: ABNORMAL
RH BLD: NORMAL
SODIUM SERPL-SCNC: 138 MMOL/L (ref 135–145)
SP GR UR STRIP.AUTO: >=1.03
UROBILINOGEN UR STRIP.AUTO-MCNC: 0.2 MG/DL
WBC # BLD AUTO: 10.9 K/UL (ref 4.8–10.8)
WBC #/AREA URNS HPF: ABNORMAL /HPF

## 2022-08-07 PROCEDURE — 81001 URINALYSIS AUTO W/SCOPE: CPT

## 2022-08-07 PROCEDURE — 80053 COMPREHEN METABOLIC PANEL: CPT

## 2022-08-07 PROCEDURE — 36415 COLL VENOUS BLD VENIPUNCTURE: CPT

## 2022-08-07 PROCEDURE — 84702 CHORIONIC GONADOTROPIN TEST: CPT

## 2022-08-07 PROCEDURE — A9270 NON-COVERED ITEM OR SERVICE: HCPCS | Performed by: EMERGENCY MEDICINE

## 2022-08-07 PROCEDURE — 99284 EMERGENCY DEPT VISIT MOD MDM: CPT

## 2022-08-07 PROCEDURE — 86901 BLOOD TYPING SEROLOGIC RH(D): CPT

## 2022-08-07 PROCEDURE — 700102 HCHG RX REV CODE 250 W/ 637 OVERRIDE(OP): Performed by: EMERGENCY MEDICINE

## 2022-08-07 PROCEDURE — 76801 OB US < 14 WKS SINGLE FETUS: CPT

## 2022-08-07 PROCEDURE — 85025 COMPLETE CBC W/AUTO DIFF WBC: CPT

## 2022-08-07 RX ORDER — NITROFURANTOIN 25; 75 MG/1; MG/1
100 CAPSULE ORAL 2 TIMES DAILY
Qty: 14 CAPSULE | Refills: 0 | Status: SHIPPED | OUTPATIENT
Start: 2022-08-07 | End: 2022-08-14

## 2022-08-07 RX ORDER — NITROFURANTOIN 25; 75 MG/1; MG/1
100 CAPSULE ORAL ONCE
Status: COMPLETED | OUTPATIENT
Start: 2022-08-07 | End: 2022-08-07

## 2022-08-07 RX ADMIN — NITROFURANTOIN MONOHYDRATE/MACROCRYSTALLINE 100 MG: 25; 75 CAPSULE ORAL at 06:37

## 2022-08-07 ASSESSMENT — FIBROSIS 4 INDEX: FIB4 SCORE: 0.3

## 2022-08-07 NOTE — ED TRIAGE NOTES
"Chief Complaint   Patient presents with   • Abdominal Pain   • Vaginal Bleeding   • Pregnancy     Pt reports bilateral lower abd cramping and vaginal bleeding, pt 10 weeks pregnant. Pt states she went to bed with pain last night and got up to urinate this AM when she wiped and noted blood on tissue. Pt states she has not yet saturated one pad.     Pt is alert/oriented, following commands, and answering questions appropriately. No acute distress noted in triage and respirations are even and unlabored.   Pt placed in lobby and educated on triage process. Pt encouraged to alert staff for any changes in condition.    ./67   Pulse 74   Temp 36.4 °C (97.5 °F) (Temporal)   Resp 18   Ht 1.702 m (5' 7\")   Wt 76.5 kg (168 lb 10.4 oz)   SpO2 99% Comment: Room air  BMI 26.41 kg/m²     "

## 2022-08-07 NOTE — ED PROVIDER NOTES
ED Provider Note    Scribed for Robbie Madden M.D. by Isamar Abdi. 8/7/2022  5:11 AM    Primary care provider: Clifton Verma M.D.  Means of arrival: Walk-in  History obtained from: Patient   History limited by: None     CHIEF COMPLAINT  Chief Complaint   Patient presents with    Abdominal Pain    Vaginal Bleeding    Pregnancy       [facility] is a 26 y.o. female who presents to the Emergency Department for evaluation of sudden vaginal bleeding onset tonight prior to arrival. Patient is 10 weeks pregnant for the first time. Patient states that she went to bed last night with abdominal pain. She states that she awoke prior to arrival to urinate and when she wiped she noted blood on the tissues. She notes that she has not yet saturated a pad. Patient reports that she follows up with her OBGYN. She notes that she last saw her on 8/2/22 and has another appointment in 2 days. She adds that her doctor already did an ultrasound and ruled out an ectopic pregnancy. She admits to associated symptoms of diffuse abdominal pain, cramping, and nausea, but denies dysuria, vomiting, or fevers. No alleviating factors were reported.     REVIEW OF SYSTEMS  Pertinent positives include sudden vaginal bleeding, diffuse abdominal pain, cramping, and nausea.   Pertinent negatives include no dysuria, vomiting, or fevers.    All other systems reviewed and negative. See HPI for further details.     PAST MEDICAL HISTORY   has a past medical history of Asthma, Chronic back pain, and Obesity.    SURGICAL HISTORY  patient denies any surgical history    SOCIAL HISTORY  Social History     Tobacco Use    Smoking status: Never Smoker    Smokeless tobacco: Never Used   Vaping Use    Vaping Use: Former   Substance Use Topics    Alcohol use: Not Currently     Comment: Pregnant: occassionally prior to pregnancy    Drug use: Not Currently      Social History     Substance and Sexual Activity   Drug Use Not Currently  "      FAMILY HISTORY  Family History   Problem Relation Age of Onset    Arthritis Mother     No Known Problems Father     Lung Disease Brother         asthma     Diabetes Maternal Grandfather     Cancer Maternal Grandfather         prostate ca        CURRENT MEDICATIONS  Home Medications       Reviewed by Daly De La Torre R.N. (Registered Nurse) on 08/07/22 at 0428  Med List Status: Not Addressed     Medication Last Dose Status   albuterol 108 (90 Base) MCG/ACT Aero Soln inhalation aerosol  Active   EPINEPHrine (EPIPEN) 0.3 MG/0.3ML Solution Auto-injector solution for injection  Active   ergocalciferol (DRISDOL) 37912 UNIT capsule  Active   ibuprofen (MOTRIN) 200 MG Tab  Active   predniSONE (DELTASONE) 10 MG Tab  Active   TRELEGY ELLIPTA 100-62.5-25 MCG/INH AEROSOL POWDER, BREATH ACTIVATED  Active                    ALLERGIES  No Known Allergies    PHYSICAL EXAM  VITAL SIGNS: /67   Pulse 74   Temp 36.4 °C (97.5 °F) (Temporal)   Resp 18   Ht 1.702 m (5' 7\")   Wt 76.5 kg (168 lb 10.4 oz)   SpO2 99% Comment: Room air  BMI 26.41 kg/m²     Nursing note and vitals reviewed.  Constitutional: Well-developed and well-nourished. No distress.   HENT: Head is normocephalic and atraumatic. Oropharynx is clear and moist without exudate or erythema.   Eyes: Pupils are equal, round, and reactive to light. Conjunctiva are normal.   Cardiovascular: Normal rate and regular rhythm. No murmur heard. Normal radial pulses.  Pulmonary/Chest: Breath sounds normal. No wheezes or rales.   Abdominal: Soft, and non-distended. Normal active bowel sounds. No guarding or peritoneal signs. No palpable abdominal aortic aneurysm. No masses. No tenderness at McBurney's point. Unable to elicit any abdominal tenderness.   Musculoskeletal: Extremities exhibit normal range of motion without edema or tenderness.   Neurological: Awake, alert and oriented to person, place, and time. No focal deficits noted.  Skin: Skin is warm and dry. No " rash.  Psychiatric: Normal mood and affect. Appropriate for clinical situation    DIAGNOSTIC STUDIES / PROCEDURES    LABS  Results for orders placed or performed during the hospital encounter of 08/07/22   CBC WITH DIFFERENTIAL   Result Value Ref Range    WBC 10.9 (H) 4.8 - 10.8 K/uL    RBC 4.69 4.20 - 5.40 M/uL    Hemoglobin 14.7 12.0 - 16.0 g/dL    Hematocrit 41.2 37.0 - 47.0 %    MCV 87.8 81.4 - 97.8 fL    MCH 31.3 27.0 - 33.0 pg    MCHC 35.7 (H) 33.6 - 35.0 g/dL    RDW 38.5 35.9 - 50.0 fL    Platelet Count 255 164 - 446 K/uL    MPV 9.9 9.0 - 12.9 fL    Neutrophils-Polys 70.50 44.00 - 72.00 %    Lymphocytes 23.50 22.00 - 41.00 %    Monocytes 4.30 0.00 - 13.40 %    Eosinophils 1.00 0.00 - 6.90 %    Basophils 0.30 0.00 - 1.80 %    Immature Granulocytes 0.40 0.00 - 0.90 %    Nucleated RBC 0.00 /100 WBC    Neutrophils (Absolute) 7.71 (H) 2.00 - 7.15 K/uL    Lymphs (Absolute) 2.57 1.00 - 4.80 K/uL    Monos (Absolute) 0.47 0.00 - 0.85 K/uL    Eos (Absolute) 0.11 0.00 - 0.51 K/uL    Baso (Absolute) 0.03 0.00 - 0.12 K/uL    Immature Granulocytes (abs) 0.04 0.00 - 0.11 K/uL    NRBC (Absolute) 0.00 K/uL   COMP METABOLIC PANEL   Result Value Ref Range    Sodium 138 135 - 145 mmol/L    Potassium 3.6 3.6 - 5.5 mmol/L    Chloride 105 96 - 112 mmol/L    Co2 19 (L) 20 - 33 mmol/L    Anion Gap 14.0 7.0 - 16.0    Glucose 89 65 - 99 mg/dL    Bun 11 8 - 22 mg/dL    Creatinine 0.42 (L) 0.50 - 1.40 mg/dL    Calcium 9.5 8.5 - 10.5 mg/dL    AST(SGOT) 15 12 - 45 U/L    ALT(SGPT) 15 2 - 50 U/L    Alkaline Phosphatase 76 30 - 99 U/L    Total Bilirubin 0.9 0.1 - 1.5 mg/dL    Albumin 4.2 3.2 - 4.9 g/dL    Total Protein 6.9 6.0 - 8.2 g/dL    Globulin 2.7 1.9 - 3.5 g/dL    A-G Ratio 1.6 g/dL   RH TYPE FOR RHOGAM FROM E.D.   Result Value Ref Range    Emergency Department Rh Typing POS     Number Of Rh Doses Indicated ZERO    HCG QUANTITATIVE   Result Value Ref Range    [facility] 975861.0 (H) 0.0 - 5.0 mIU/mL   URINALYSIS,CULTURE IF INDICATED     Specimen: Urine   Result Value Ref Range    Color Yellow     Character Clear     Specific Gravity >=1.030 <1.035    Ph 6.0 5.0 - 8.0    Glucose Negative Negative mg/dL    Ketones >=80 (A) Negative mg/dL    Protein Negative Negative mg/dL    Bilirubin Negative Negative    Urobilinogen, Urine 0.2 Negative    Nitrite Negative Negative    Leukocyte Esterase Negative Negative    Occult Blood Large (A) Negative    Micro Urine Req Microscopic    URINE MICROSCOPIC (W/UA)   Result Value Ref Range    WBC 5-10 (A) /hpf    RBC 10-20 (A) /hpf    Bacteria Few (A) None /hpf    Epithelial Cells Few /hpf    Hyaline Cast 0-2 /lpf   ESTIMATED GFR   Result Value Ref Range    GFR (CKD-EPI) 139 >60 mL/min/1.73 m 2     All labs reviewed by me.    RADIOLOGY  US-OB 1ST TRIMESTER WITH TRANSVAGINAL (COMBO)   Final Result      1.  Single living intrauterine pregnancy at 10 weeks, 4 days estimated gestational age.   2.  Two areas of subchorionic hemorrhages.        The radiologist's interpretation of all radiological studies have been reviewed by me.    COURSE & MEDICAL DECISION MAKING  Nursing notes, VS, PMSFHx reviewed in chart.     Review of past medical records shows the patient was last here in 2019 for unrelated complaints.      5:11 AM - Patient seen and examined at bedside. Ordered US OB 1st trimester w/, urine microscopic, eGFR, CBC w/diff, CMP, RH type, HCG quant, and UA to evaluate her symptoms. The differential diagnoses include but are not limited to: miscarriage, threatened miscarriage, or UTI. Ectopic pregnancy ruled out by OBGYN. Discussed plan of care with patient. I informed them that labs and imaging will be ordered to evaluate symptoms. Patient is understanding and agreeable with plan.     6:21 AM - UA is suggestive of UTI. Patient will be treated with Macrobid 100mg capsule at this time. The patient understood and is in agreement.     7:06 AM - I reevaluated the patient at bedside. I discussed the patient's diagnostic study  results which show a single living intrauterine pregnancy with subchorionic hemorrhages.  Discussed with the patient that this does mean that she has a slight increased risk of miscarriage.  I told her to follow up with her OBGYN. I discussed plan for discharge and follow up as outlined below. The patient is stable for discharge at this time and will return for any new or worsening symptoms. Patient verbalizes understanding and support with my plan for discharge.         DISPOSITION:  Patient will be discharged home in stable condition.    FOLLOW UP:  [facility], Emergency Dept  [address] [address] [phone]    If symptoms worsen    Kassandra Goldman M.D.  [address]  [place] [facility] [address] [phone]    On 8/8/2022        OUTPATIENT MEDICATIONS:  New Prescriptions    NITROFURANTOIN (MACROBID) 100 MG CAP    Take 1 Capsule by mouth 2 times a day for 7 days.       FINAL IMPRESSION  1. Threatened miscarriage in early pregnancy    2. Subchorionic hemorrhage of placenta in first trimester, single or unspecified fetus    3. Urinary tract infection in mother during first trimester of pregnancy          Isamar ORTEGA (Анна), am scribing for, and in the presence of, Robbie Madden M.D..    Electronically signed by: Isamar Marin), 8/7/2022    Robbie ORTEGA M.D. personally performed the services described in this documentation, as scribed by Isamar Abdi in my presence, and it is both accurate and complete. C.     The note accurately reflects work and decisions made by me.  Robbie Madden M.D.  8/7/2022  10:56 AM

## 2022-08-10 ENCOUNTER — HOSPITAL ENCOUNTER (EMERGENCY)
Facility: MEDICAL CENTER | Age: 27
End: 2022-08-10
Attending: EMERGENCY MEDICINE
Payer: MEDICAID

## 2022-08-10 ENCOUNTER — APPOINTMENT (OUTPATIENT)
Dept: RADIOLOGY | Facility: MEDICAL CENTER | Age: 27
End: 2022-08-10
Attending: EMERGENCY MEDICINE
Payer: MEDICAID

## 2022-08-10 VITALS
OXYGEN SATURATION: 99 % | SYSTOLIC BLOOD PRESSURE: 124 MMHG | WEIGHT: 166 LBS | HEIGHT: 67 IN | BODY MASS INDEX: 26.06 KG/M2 | RESPIRATION RATE: 15 BRPM | TEMPERATURE: 98.1 F | DIASTOLIC BLOOD PRESSURE: 88 MMHG | HEART RATE: 87 BPM

## 2022-08-10 DIAGNOSIS — O03.9 MISCARRIAGE: ICD-10-CM

## 2022-08-10 LAB
ALBUMIN SERPL BCP-MCNC: 4 G/DL (ref 3.2–4.9)
ALBUMIN/GLOB SERPL: 1.4 G/DL
ALP SERPL-CCNC: 80 U/L (ref 30–99)
ALT SERPL-CCNC: 12 U/L (ref 2–50)
ANION GAP SERPL CALC-SCNC: 12 MMOL/L (ref 7–16)
APPEARANCE UR: ABNORMAL
AST SERPL-CCNC: 14 U/L (ref 12–45)
B-HCG SERPL-ACNC: ABNORMAL MIU/ML (ref 0–5)
BACTERIA #/AREA URNS HPF: NEGATIVE /HPF
BASOPHILS # BLD AUTO: 0.3 % (ref 0–1.8)
BASOPHILS # BLD: 0.03 K/UL (ref 0–0.12)
BILIRUB SERPL-MCNC: 1.1 MG/DL (ref 0.1–1.5)
BILIRUB UR QL STRIP.AUTO: NEGATIVE
BUN SERPL-MCNC: 8 MG/DL (ref 8–22)
CALCIUM SERPL-MCNC: 8.7 MG/DL (ref 8.5–10.5)
CHLORIDE SERPL-SCNC: 109 MMOL/L (ref 96–112)
CO2 SERPL-SCNC: 18 MMOL/L (ref 20–33)
COLOR UR: ABNORMAL
CREAT SERPL-MCNC: 0.48 MG/DL (ref 0.5–1.4)
EOSINOPHIL # BLD AUTO: 0.09 K/UL (ref 0–0.51)
EOSINOPHIL NFR BLD: 0.9 % (ref 0–6.9)
EPI CELLS #/AREA URNS HPF: ABNORMAL /HPF
ERYTHROCYTE [DISTWIDTH] IN BLOOD BY AUTOMATED COUNT: 38.5 FL (ref 35.9–50)
GFR SERPLBLD CREATININE-BSD FMLA CKD-EPI: 135 ML/MIN/1.73 M 2
GLOBULIN SER CALC-MCNC: 2.8 G/DL (ref 1.9–3.5)
GLUCOSE SERPL-MCNC: 92 MG/DL (ref 65–99)
GLUCOSE UR STRIP.AUTO-MCNC: NEGATIVE MG/DL
HCT VFR BLD AUTO: 37.2 % (ref 37–47)
HGB BLD-MCNC: 13 G/DL (ref 12–16)
HYALINE CASTS #/AREA URNS LPF: ABNORMAL /LPF
IMM GRANULOCYTES # BLD AUTO: 0.06 K/UL (ref 0–0.11)
IMM GRANULOCYTES NFR BLD AUTO: 0.6 % (ref 0–0.9)
KETONES UR STRIP.AUTO-MCNC: 80 MG/DL
LEUKOCYTE ESTERASE UR QL STRIP.AUTO: ABNORMAL
LIPASE SERPL-CCNC: 37 U/L (ref 11–82)
LYMPHOCYTES # BLD AUTO: 2.23 K/UL (ref 1–4.8)
LYMPHOCYTES NFR BLD: 21.6 % (ref 22–41)
MCH RBC QN AUTO: 30.7 PG (ref 27–33)
MCHC RBC AUTO-ENTMCNC: 34.9 G/DL (ref 33.6–35)
MCV RBC AUTO: 87.9 FL (ref 81.4–97.8)
MICRO URNS: ABNORMAL
MONOCYTES # BLD AUTO: 0.6 K/UL (ref 0–0.85)
MONOCYTES NFR BLD AUTO: 5.8 % (ref 0–13.4)
NEUTROPHILS # BLD AUTO: 7.33 K/UL (ref 2–7.15)
NEUTROPHILS NFR BLD: 70.8 % (ref 44–72)
NITRITE UR QL STRIP.AUTO: NEGATIVE
NRBC # BLD AUTO: 0 K/UL
NRBC BLD-RTO: 0 /100 WBC
PATHOLOGY CONSULT NOTE: NORMAL
PH UR STRIP.AUTO: 5.5 [PH] (ref 5–8)
PLATELET # BLD AUTO: 219 K/UL (ref 164–446)
PMV BLD AUTO: 10.1 FL (ref 9–12.9)
POTASSIUM SERPL-SCNC: 3.8 MMOL/L (ref 3.6–5.5)
PROT SERPL-MCNC: 6.8 G/DL (ref 6–8.2)
PROT UR QL STRIP: NEGATIVE MG/DL
RBC # BLD AUTO: 4.23 M/UL (ref 4.2–5.4)
RBC # URNS HPF: >150 /HPF
RBC UR QL AUTO: ABNORMAL
SODIUM SERPL-SCNC: 139 MMOL/L (ref 135–145)
SP GR UR STRIP.AUTO: 1.02
UROBILINOGEN UR STRIP.AUTO-MCNC: 1 MG/DL
WBC # BLD AUTO: 10.3 K/UL (ref 4.8–10.8)
WBC #/AREA URNS HPF: ABNORMAL /HPF

## 2022-08-10 PROCEDURE — A9270 NON-COVERED ITEM OR SERVICE: HCPCS | Performed by: EMERGENCY MEDICINE

## 2022-08-10 PROCEDURE — 99284 EMERGENCY DEPT VISIT MOD MDM: CPT

## 2022-08-10 PROCEDURE — 36415 COLL VENOUS BLD VENIPUNCTURE: CPT

## 2022-08-10 PROCEDURE — 83690 ASSAY OF LIPASE: CPT

## 2022-08-10 PROCEDURE — 76801 OB US < 14 WKS SINGLE FETUS: CPT

## 2022-08-10 PROCEDURE — 85025 COMPLETE CBC W/AUTO DIFF WBC: CPT

## 2022-08-10 PROCEDURE — 81001 URINALYSIS AUTO W/SCOPE: CPT

## 2022-08-10 PROCEDURE — 84702 CHORIONIC GONADOTROPIN TEST: CPT

## 2022-08-10 PROCEDURE — 80053 COMPREHEN METABOLIC PANEL: CPT

## 2022-08-10 PROCEDURE — 700102 HCHG RX REV CODE 250 W/ 637 OVERRIDE(OP): Performed by: EMERGENCY MEDICINE

## 2022-08-10 PROCEDURE — 88305 TISSUE EXAM BY PATHOLOGIST: CPT

## 2022-08-10 RX ORDER — ACETAMINOPHEN 325 MG/1
650 TABLET ORAL ONCE
Status: COMPLETED | OUTPATIENT
Start: 2022-08-10 | End: 2022-08-10

## 2022-08-10 RX ADMIN — ACETAMINOPHEN 650 MG: 325 TABLET, FILM COATED ORAL at 20:45

## 2022-08-10 ASSESSMENT — FIBROSIS 4 INDEX: FIB4 SCORE: .3645160796430510009

## 2022-08-10 ASSESSMENT — PAIN DESCRIPTION - PAIN TYPE: TYPE: ACUTE PAIN

## 2022-08-10 NOTE — ED TRIAGE NOTES
Chief Complaint   Patient presents with    Vaginal Bleeding     Patient seen on 8/7 for subchorionic hemorrhages related to her pregnancy. Patient was told the following day by her OBGYN that she miscarried. Per patient, she was passing clots at the time. Presents today for worsening abdominal cramping and vaginal bleeding. Patient also is concerned because she took Ibuprofen and has a history of a gastric sleeve.

## 2022-08-11 NOTE — ED PROVIDER NOTES
ED Provider Note    Scribed for Jeff Augustin M.D. by Mikal De La Vega. 8/10/2022  5:36 PM    Primary care provider: None stated  Means of arrival: Walk-In  History obtained from: Patient  History limited by: None    CHIEF COMPLAINT  Chief Complaint   Patient presents with    Vaginal Bleeding     Patient seen on 8/7 for subchorionic hemorrhages related to her pregnancy. Patient was told the following day by her OBGYN that she miscarried. Per patient, she was passing clots at the time. Presents today for worsening abdominal cramping and vaginal bleeding. Patient also is concerned because she took Ibuprofen and has a history of a gastric sleeve.        ZAIRA Orr is a 26 y.o. female who presents to the Emergency Department for evaluation of vaginal bleeding onset 3 days ago.  She states that this is her first pregnancy.  She presented here 3 days ago with vaginal bleeding in the setting of 10-week pregnancy and was told to go to her OBGYN (Dr. Goldman).  Pelvic ultrasound at that time showed subchorionic hemorrhage though live IUP.  She followed up with the OB and was found to have miscarried. Dr. Goldman told her to come here if she had more abdominal pain or bleeding. She has associated abdominal pain. She denies any fever. No alleviating or exacerbating factors noted. She adds that she has a past surgical history of a gastric sleeve.    REVIEW OF SYSTEMS  Pertinent positives include vaginal bleeding, and abdominal pain. Pertinent negatives include no fever.  All other systems reviewed and negative.    PAST MEDICAL HISTORY   has a past medical history of Asthma, Chronic back pain, and Obesity.    SURGICAL HISTORY  patient denies any surgical history    SOCIAL HISTORY  Social History     Tobacco Use    Smoking status: Never    Smokeless tobacco: Never   Vaping Use    Vaping Use: Former   Substance Use Topics    Alcohol use: Not Currently     Comment: Pregnant: occassionally prior to pregnancy     "Drug use: Not Currently      Social History     Substance and Sexual Activity   Drug Use Not Currently       FAMILY HISTORY  Family History   Problem Relation Age of Onset    Arthritis Mother     No Known Problems Father     Lung Disease Brother         asthma     Diabetes Maternal Grandfather     Cancer Maternal Grandfather         prostate ca        CURRENT MEDICATIONS  Home Medications       Reviewed by Yanet Fine R.N. (Registered Nurse) on 08/10/22 at 1636  Med List Status: Partial     Medication Last Dose Status   albuterol 108 (90 Base) MCG/ACT Aero Soln inhalation aerosol  Active   EPINEPHrine (EPIPEN) 0.3 MG/0.3ML Solution Auto-injector solution for injection  Active   ergocalciferol (DRISDOL) 88624 UNIT capsule  Active   ibuprofen (MOTRIN) 200 MG Tab  Active   nitrofurantoin (MACROBID) 100 MG Cap  Active   predniSONE (DELTASONE) 10 MG Tab  Active   TRELEGY ELLIPTA 100-62.5-25 MCG/INH AEROSOL POWDER, BREATH ACTIVATED  Active                    ALLERGIES  No Known Allergies    PHYSICAL EXAM  VITAL SIGNS: BP (!) 126/91   Pulse 93   Temp 36.7 °C (98 °F) (Oral)   Resp 16   Ht 1.702 m (5' 7\")   Wt 75.3 kg (166 lb)   SpO2 98%   BMI 26.00 kg/m²   Pulse ox interpretation: Normal  Constitutional: No acute distress, Non-toxic appearance.   HENT: Normocephalic, Atraumatic  Eyes: Conjunctiva normal, No discharge.   Neck: Normal range of motion, Supple, No stridor.   Cardiovascular: Normal heart rate, Normal rhythm  Thorax & Lungs: Normal breath sounds, No respiratory distress  Abdomen: Soft, No masses, No pulsatile masses. Diffuse lower abdominal tenderness  : Pelvic exam showing what appears to be products of conception in the vaginal vault and likely within the cervical os as it did require some pulling with ring forceps to remove these products of conception.  They were placed in a container and sent to pathology for evaluation.  Cervix did not have residual products of conception within the os. No " active bleeding from the os.  Skin: Warm, Dry, No erythema, No rash.   Neurologic: Alert, No focal deficits noted.     LABS  Labs Reviewed   CBC WITH DIFFERENTIAL - Abnormal; Notable for the following components:       Result Value    Lymphocytes 21.60 (*)     Neutrophils (Absolute) 7.33 (*)     All other components within normal limits   COMP METABOLIC PANEL - Abnormal; Notable for the following components:    Co2 18 (*)     Creatinine 0.48 (*)     All other components within normal limits   HCG QUANTITATIVE - Abnormal; Notable for the following components:    Bhcg 00232.0 (*)     All other components within normal limits   LIPASE   ESTIMATED GFR   URINALYSIS,CULTURE IF INDICATED     All labs reviewed by me.    RADIOLOGY  US-OB 1ST TRIMESTER WITH TRANSVAGINAL (COMBO)   Final Result      1.  No IUP demonstrated, consistent with spontaneous .   2.  Findings concerning for retained products of conception.   3.  Small fluid in the cul-de-sac.              The radiologist's interpretation of all radiological studies have been reviewed by me.    COURSE & MEDICAL DECISION MAKING  Pertinent Labs & Imaging studies reviewed. (See chart for details)    5:36 PM - Patient seen and examined at bedside. I have discussed the plan of care with the patient, which includes labs and an ultrasound. Patient understands and agrees to the plan of care. Ordered US-OB 1st trimester with transvaginal (combo), CBC w/ diff, CMP, Lipase, HCG Quantitative, and UA culture to evaluate her symptoms.         Decision Making:  This is a 26 y.o. year old female who presents with vaginal bleeding and pelvic cramping following recent diagnosis of miscarriage at her OB office.  She was approximately 10 weeks pregnant at the time.  She states that this is her first pregnancy.  There were products of conception in the vaginal vault on pelvic examination.  These were removed.  No residual bleeding from the cervical os.  Pelvic ultrasound was  performed showing no IUP and possible retained uterine products of conception.    I spoke with Dr. Goldman from OB/GYN regarding her patient.  She is recommending expectant management and outpatient follow-up.  No indication at this time for Methergine or D&C.  She is requesting sending the products of conception to pathology for further evaluation.  I did retrieve the products of conception and sent them to pathology as requested by the OB/GYN.  Patient was discharged in stable condition.    I reviewed the results and plan of care with the patient.  She states that the cramping is very mild now and improved from before.  No sniffing active bleeding.  Patient was ultimately discharged in stable condition.  Recommending outpatient follow-up with Dr. Goldman.  Laboratory studies are largely reassuring.  No significant anemia.    [facility], Emergency Dept  [address] [facility] [address] [phone]    As needed, If symptoms worsen    Primary care doctor    Schedule an appointment as soon as possible for a visit       Kassandra Goldman M.D.  [address] [place] [id number] [phone]    Schedule an appointment as soon as possible for a visit       FINAL IMPRESSION  1. Miscarriage         This dictation has been created using voice recognition software and/or scribes. The accuracy of the dictation is limited by the abilities of the software and the expertise of the scribes. I expect there may be some errors of grammar and possibly content. I made every attempt to manually correct the errors within my dictation. However, errors related to voice recognition software and/or scribes may still exist and should be interpreted within the appropriate context.    IMikal (Анна), am scribing for, and in the presence of, Jeff Augustin M.D..    Electronically signed by: Mikal De La Vega (Анна), 8/10/2022    Jeff ORTEGA M.D. personally performed the services described in this  documentation, as scribed by Mikal De La Vega in my presence, and it is both accurate and complete.      The note accurately reflects work and decisions made by me.  Jeff Augustin M.D.  8/11/2022  12:32 AM    C

## 2022-08-11 NOTE — ED NOTES
Pt discharged and understanding of discharge information at this time. Pt alert and oriented, NAD noted, VSS. Pt ambulated to lobby with all belongings. Work excuse given to pt significant other.

## 2022-08-19 ENCOUNTER — HOSPITAL ENCOUNTER (OUTPATIENT)
Facility: MEDICAL CENTER | Age: 27
End: 2022-08-19
Attending: NURSE PRACTITIONER
Payer: COMMERCIAL

## 2022-08-19 ENCOUNTER — EMPLOYEE HEALTH (OUTPATIENT)
Dept: OCCUPATIONAL MEDICINE | Facility: CLINIC | Age: 27
End: 2022-08-19

## 2022-08-19 ENCOUNTER — EH NON-PROVIDER (OUTPATIENT)
Dept: OCCUPATIONAL MEDICINE | Facility: CLINIC | Age: 27
End: 2022-08-19

## 2022-08-19 DIAGNOSIS — Z02.1 PRE-EMPLOYMENT HEALTH SCREENING EXAMINATION: ICD-10-CM

## 2022-08-19 DIAGNOSIS — Z02.83 ENCOUNTER FOR DRUG SCREENING: ICD-10-CM

## 2022-08-19 DIAGNOSIS — Z02.89 ENCOUNTER FOR OCCUPATIONAL HEALTH ASSESSMENT: ICD-10-CM

## 2022-08-19 DIAGNOSIS — Z02.1 PRE-EMPLOYMENT DRUG SCREENING: ICD-10-CM

## 2022-08-19 LAB
AMP AMPHETAMINE: NORMAL
BAR BARBITURATES: NORMAL
BZO BENZODIAZEPINES: NORMAL
COC COCAINE: NORMAL
INT CON NEG: NORMAL
INT CON POS: NORMAL
MDMA ECSTASY: NORMAL
MET METHAMPHETAMINES: NORMAL
MTD METHADONE: NORMAL
OPI OPIATES: NORMAL
OXY OXYCODONE: NORMAL
PCP PHENCYCLIDINE: NORMAL
POC URINE DRUG SCREEN OCDRS: NORMAL
THC: NORMAL

## 2022-08-19 PROCEDURE — 8911 PR MRO FEE: Performed by: NURSE PRACTITIONER

## 2022-08-19 PROCEDURE — 94375 RESPIRATORY FLOW VOLUME LOOP: CPT | Performed by: NURSE PRACTITIONER

## 2022-08-19 PROCEDURE — 8915 PR COMPREHENSIVE PHYSICAL: Performed by: NURSE PRACTITIONER

## 2022-08-19 PROCEDURE — 80305 DRUG TEST PRSMV DIR OPT OBS: CPT | Performed by: NURSE PRACTITIONER

## 2022-08-19 PROCEDURE — 94010 BREATHING CAPACITY TEST: CPT | Performed by: NURSE PRACTITIONER

## 2022-08-19 PROCEDURE — 86480 TB TEST CELL IMMUN MEASURE: CPT | Performed by: PREVENTIVE MEDICINE

## 2022-08-22 LAB
GAMMA INTERFERON BACKGROUND BLD IA-ACNC: 0.03 IU/ML
M TB IFN-G BLD-IMP: NEGATIVE
M TB IFN-G CD4+ BCKGRND COR BLD-ACNC: 0 IU/ML
MITOGEN IGNF BCKGRD COR BLD-ACNC: >10 IU/ML
QFT TB2 - NIL TBQ2: 0 IU/ML

## 2022-09-16 ENCOUNTER — EH NON-PROVIDER (OUTPATIENT)
Dept: OCCUPATIONAL MEDICINE | Facility: CLINIC | Age: 27
End: 2022-09-16
Payer: MEDICAID

## 2022-11-21 ENCOUNTER — HOSPITAL ENCOUNTER (OUTPATIENT)
Dept: LAB | Facility: MEDICAL CENTER | Age: 27
End: 2022-11-21
Attending: FAMILY MEDICINE
Payer: COMMERCIAL

## 2022-11-21 LAB
ALBUMIN SERPL BCP-MCNC: 4.7 G/DL (ref 3.2–4.9)
ALBUMIN/GLOB SERPL: 1.7 G/DL
ALP SERPL-CCNC: 78 U/L (ref 30–99)
ALT SERPL-CCNC: 14 U/L (ref 2–50)
ANION GAP SERPL CALC-SCNC: 11 MMOL/L (ref 7–16)
AST SERPL-CCNC: 15 U/L (ref 12–45)
BASOPHILS # BLD AUTO: 0.6 % (ref 0–1.8)
BASOPHILS # BLD: 0.03 K/UL (ref 0–0.12)
BILIRUB SERPL-MCNC: 0.9 MG/DL (ref 0.1–1.5)
BUN SERPL-MCNC: 17 MG/DL (ref 8–22)
CALCIUM SERPL-MCNC: 9.6 MG/DL (ref 8.5–10.5)
CHLORIDE SERPL-SCNC: 106 MMOL/L (ref 96–112)
CO2 SERPL-SCNC: 22 MMOL/L (ref 20–33)
CREAT SERPL-MCNC: 0.57 MG/DL (ref 0.5–1.4)
EOSINOPHIL # BLD AUTO: 0.06 K/UL (ref 0–0.51)
EOSINOPHIL NFR BLD: 1.1 % (ref 0–6.9)
ERYTHROCYTE [DISTWIDTH] IN BLOOD BY AUTOMATED COUNT: 37 FL (ref 35.9–50)
GFR SERPLBLD CREATININE-BSD FMLA CKD-EPI: 129 ML/MIN/1.73 M 2
GLOBULIN SER CALC-MCNC: 2.8 G/DL (ref 1.9–3.5)
GLUCOSE SERPL-MCNC: 91 MG/DL (ref 65–99)
HCT VFR BLD AUTO: 42.9 % (ref 37–47)
HGB BLD-MCNC: 14.6 G/DL (ref 12–16)
IMM GRANULOCYTES # BLD AUTO: 0.01 K/UL (ref 0–0.11)
IMM GRANULOCYTES NFR BLD AUTO: 0.2 % (ref 0–0.9)
LYMPHOCYTES # BLD AUTO: 2.02 K/UL (ref 1–4.8)
LYMPHOCYTES NFR BLD: 37.9 % (ref 22–41)
MCH RBC QN AUTO: 29.7 PG (ref 27–33)
MCHC RBC AUTO-ENTMCNC: 34 G/DL (ref 33.6–35)
MCV RBC AUTO: 87.2 FL (ref 81.4–97.8)
MONOCYTES # BLD AUTO: 0.33 K/UL (ref 0–0.85)
MONOCYTES NFR BLD AUTO: 6.2 % (ref 0–13.4)
NEUTROPHILS # BLD AUTO: 2.88 K/UL (ref 2–7.15)
NEUTROPHILS NFR BLD: 54 % (ref 44–72)
NRBC # BLD AUTO: 0 K/UL
NRBC BLD-RTO: 0 /100 WBC
PLATELET # BLD AUTO: 277 K/UL (ref 164–446)
PMV BLD AUTO: 10.6 FL (ref 9–12.9)
POTASSIUM SERPL-SCNC: 3.8 MMOL/L (ref 3.6–5.5)
PROT SERPL-MCNC: 7.5 G/DL (ref 6–8.2)
RBC # BLD AUTO: 4.92 M/UL (ref 4.2–5.4)
SODIUM SERPL-SCNC: 139 MMOL/L (ref 135–145)
T4 FREE SERPL-MCNC: 1.18 NG/DL (ref 0.93–1.7)
TSH SERPL DL<=0.005 MIU/L-ACNC: 1.25 UIU/ML (ref 0.38–5.33)
WBC # BLD AUTO: 5.3 K/UL (ref 4.8–10.8)

## 2022-11-21 PROCEDURE — 84439 ASSAY OF FREE THYROXINE: CPT

## 2022-11-21 PROCEDURE — 80053 COMPREHEN METABOLIC PANEL: CPT

## 2022-11-21 PROCEDURE — 84443 ASSAY THYROID STIM HORMONE: CPT

## 2022-11-21 PROCEDURE — 85025 COMPLETE CBC W/AUTO DIFF WBC: CPT

## 2022-11-21 PROCEDURE — 36415 COLL VENOUS BLD VENIPUNCTURE: CPT

## 2023-02-28 ENCOUNTER — TELEPHONE (OUTPATIENT)
Dept: PHYSICAL THERAPY | Facility: REHABILITATION | Age: 28
End: 2023-02-28
Payer: MEDICAID

## 2023-02-28 NOTE — OP THERAPY DISCHARGE SUMMARY
Outpatient Physical Therapy  DISCHARGE SUMMARY NOTE      [facility] [address], [address]  [place] [address]  Phone:  [phone]    Date of Visit: 02/28/2023    Patient: Mar Orr  YOB: 1995  MRN: [id number]     Referring Provider:   LEBA Velázquez      Referring Diagnosis  Low back pain, unspecified back pain laterality, unspecified chronicity, unspecified whether sciatica present            Functional Assessment Used        Your patient is being discharged from Physical Therapy with the following comments:   Patient has failed to schedule or reschedule follow-up visits    Comments:       Limitations Remaining:      Recommendations:  Discharge from PT    Rebekah Wylie PT, MSPT    Date: 2/28/2023

## 2023-03-07 ENCOUNTER — NON-PROVIDER VISIT (OUTPATIENT)
Dept: OCCUPATIONAL MEDICINE | Facility: CLINIC | Age: 28
End: 2023-03-07

## 2023-03-07 ENCOUNTER — HOSPITAL ENCOUNTER (OUTPATIENT)
Facility: MEDICAL CENTER | Age: 28
End: 2023-03-07
Attending: NURSE PRACTITIONER
Payer: COMMERCIAL

## 2023-03-07 DIAGNOSIS — Z02.1 PRE-EMPLOYMENT HEALTH SCREENING EXAMINATION: ICD-10-CM

## 2023-03-07 DIAGNOSIS — Z02.1 PRE-EMPLOYMENT HEALTH SCREENING EXAMINATION: Primary | ICD-10-CM

## 2023-03-07 PROCEDURE — 86480 TB TEST CELL IMMUN MEASURE: CPT | Performed by: NURSE PRACTITIONER

## 2023-03-08 LAB
GAMMA INTERFERON BACKGROUND BLD IA-ACNC: 0.03 IU/ML
M TB IFN-G BLD-IMP: NEGATIVE
M TB IFN-G CD4+ BCKGRND COR BLD-ACNC: 0.01 IU/ML
MITOGEN IGNF BCKGRD COR BLD-ACNC: 5.69 IU/ML
QFT TB2 - NIL TBQ2: 0.01 IU/ML

## 2023-08-28 ENCOUNTER — APPOINTMENT (OUTPATIENT)
Dept: RADIOLOGY | Facility: MEDICAL CENTER | Age: 28
End: 2023-08-28
Attending: EMERGENCY MEDICINE
Payer: MEDICAID

## 2023-08-28 ENCOUNTER — HOSPITAL ENCOUNTER (OUTPATIENT)
Facility: MEDICAL CENTER | Age: 28
End: 2023-08-29
Attending: EMERGENCY MEDICINE | Admitting: STUDENT IN AN ORGANIZED HEALTH CARE EDUCATION/TRAINING PROGRAM
Payer: MEDICAID

## 2023-08-28 DIAGNOSIS — J45.20 MILD INTERMITTENT ASTHMA WITHOUT COMPLICATION: ICD-10-CM

## 2023-08-28 DIAGNOSIS — I31.39 PERICARDIAL EFFUSION: ICD-10-CM

## 2023-08-28 DIAGNOSIS — R07.9 CHEST PAIN, UNSPECIFIED TYPE: ICD-10-CM

## 2023-08-28 PROBLEM — R07.89 CHEST PAIN, ATYPICAL: Status: ACTIVE | Noted: 2023-08-28

## 2023-08-28 PROBLEM — Z3A.35 35 WEEKS GESTATION OF PREGNANCY: Status: ACTIVE | Noted: 2023-08-28

## 2023-08-28 LAB
ALBUMIN SERPL BCP-MCNC: 3.9 G/DL (ref 3.2–4.9)
ALBUMIN/GLOB SERPL: 1.3 G/DL
ALP SERPL-CCNC: 118 U/L (ref 30–99)
ALT SERPL-CCNC: 9 U/L (ref 2–50)
ANION GAP SERPL CALC-SCNC: 13 MMOL/L (ref 7–16)
AST SERPL-CCNC: 15 U/L (ref 12–45)
BASOPHILS # BLD AUTO: 0.2 % (ref 0–1.8)
BASOPHILS # BLD: 0.02 K/UL (ref 0–0.12)
BILIRUB SERPL-MCNC: 1 MG/DL (ref 0.1–1.5)
BUN SERPL-MCNC: 11 MG/DL (ref 8–22)
CALCIUM ALBUM COR SERPL-MCNC: 9.3 MG/DL (ref 8.5–10.5)
CALCIUM SERPL-MCNC: 9.2 MG/DL (ref 8.5–10.5)
CHLORIDE SERPL-SCNC: 103 MMOL/L (ref 96–112)
CO2 SERPL-SCNC: 20 MMOL/L (ref 20–33)
CREAT SERPL-MCNC: 0.54 MG/DL (ref 0.5–1.4)
D DIMER PPP IA.FEU-MCNC: 0.84 UG/ML (FEU) (ref 0–0.5)
EKG IMPRESSION: NORMAL
EOSINOPHIL # BLD AUTO: 0.03 K/UL (ref 0–0.51)
EOSINOPHIL NFR BLD: 0.3 % (ref 0–6.9)
ERYTHROCYTE [DISTWIDTH] IN BLOOD BY AUTOMATED COUNT: 41.3 FL (ref 35.9–50)
GFR SERPLBLD CREATININE-BSD FMLA CKD-EPI: 130 ML/MIN/1.73 M 2
GLOBULIN SER CALC-MCNC: 3.1 G/DL (ref 1.9–3.5)
GLUCOSE SERPL-MCNC: 72 MG/DL (ref 65–99)
HCG SERPL QL: POSITIVE
HCT VFR BLD AUTO: 41.5 % (ref 37–47)
HGB BLD-MCNC: 14.1 G/DL (ref 12–16)
IMM GRANULOCYTES # BLD AUTO: 0.04 K/UL (ref 0–0.11)
IMM GRANULOCYTES NFR BLD AUTO: 0.4 % (ref 0–0.9)
LYMPHOCYTES # BLD AUTO: 1.77 K/UL (ref 1–4.8)
LYMPHOCYTES NFR BLD: 19.3 % (ref 22–41)
MCH RBC QN AUTO: 30.3 PG (ref 27–33)
MCHC RBC AUTO-ENTMCNC: 34 G/DL (ref 32.2–35.5)
MCV RBC AUTO: 89.1 FL (ref 81.4–97.8)
MONOCYTES # BLD AUTO: 0.58 K/UL (ref 0–0.85)
MONOCYTES NFR BLD AUTO: 6.3 % (ref 0–13.4)
NEUTROPHILS # BLD AUTO: 6.72 K/UL (ref 1.82–7.42)
NEUTROPHILS NFR BLD: 73.5 % (ref 44–72)
NRBC # BLD AUTO: 0 K/UL
NRBC BLD-RTO: 0 /100 WBC (ref 0–0.2)
NT-PROBNP SERPL IA-MCNC: 49 PG/ML (ref 0–125)
PLATELET # BLD AUTO: 206 K/UL (ref 164–446)
PMV BLD AUTO: 11.2 FL (ref 9–12.9)
POTASSIUM SERPL-SCNC: 3.6 MMOL/L (ref 3.6–5.5)
PROT SERPL-MCNC: 7 G/DL (ref 6–8.2)
RBC # BLD AUTO: 4.66 M/UL (ref 4.2–5.4)
SODIUM SERPL-SCNC: 136 MMOL/L (ref 135–145)
TROPONIN T SERPL-MCNC: <6 NG/L (ref 6–19)
TROPONIN T SERPL-MCNC: <6 NG/L (ref 6–19)
WBC # BLD AUTO: 9.2 K/UL (ref 4.8–10.8)

## 2023-08-28 PROCEDURE — 59025 FETAL NON-STRESS TEST: CPT

## 2023-08-28 PROCEDURE — 700117 HCHG RX CONTRAST REV CODE 255: Mod: UD | Performed by: EMERGENCY MEDICINE

## 2023-08-28 PROCEDURE — G0378 HOSPITAL OBSERVATION PER HR: HCPCS

## 2023-08-28 PROCEDURE — 71045 X-RAY EXAM CHEST 1 VIEW: CPT

## 2023-08-28 PROCEDURE — 85025 COMPLETE CBC W/AUTO DIFF WBC: CPT

## 2023-08-28 PROCEDURE — 80053 COMPREHEN METABOLIC PANEL: CPT

## 2023-08-28 PROCEDURE — 99285 EMERGENCY DEPT VISIT HI MDM: CPT

## 2023-08-28 PROCEDURE — 99223 1ST HOSP IP/OBS HIGH 75: CPT | Performed by: STUDENT IN AN ORGANIZED HEALTH CARE EDUCATION/TRAINING PROGRAM

## 2023-08-28 PROCEDURE — 93005 ELECTROCARDIOGRAM TRACING: CPT | Performed by: EMERGENCY MEDICINE

## 2023-08-28 PROCEDURE — 85379 FIBRIN DEGRADATION QUANT: CPT

## 2023-08-28 PROCEDURE — 84484 ASSAY OF TROPONIN QUANT: CPT

## 2023-08-28 PROCEDURE — 93005 ELECTROCARDIOGRAM TRACING: CPT

## 2023-08-28 PROCEDURE — 71275 CT ANGIOGRAPHY CHEST: CPT

## 2023-08-28 PROCEDURE — 36415 COLL VENOUS BLD VENIPUNCTURE: CPT

## 2023-08-28 PROCEDURE — 84703 CHORIONIC GONADOTROPIN ASSAY: CPT

## 2023-08-28 PROCEDURE — 83880 ASSAY OF NATRIURETIC PEPTIDE: CPT

## 2023-08-28 RX ORDER — ACETAMINOPHEN 325 MG/1
650 TABLET ORAL EVERY 6 HOURS PRN
Status: DISCONTINUED | OUTPATIENT
Start: 2023-08-28 | End: 2023-08-29 | Stop reason: HOSPADM

## 2023-08-28 RX ORDER — AMOXICILLIN 250 MG
2 CAPSULE ORAL 2 TIMES DAILY
Status: DISCONTINUED | OUTPATIENT
Start: 2023-08-28 | End: 2023-08-29 | Stop reason: HOSPADM

## 2023-08-28 RX ORDER — BISACODYL 10 MG
10 SUPPOSITORY, RECTAL RECTAL
Status: DISCONTINUED | OUTPATIENT
Start: 2023-08-28 | End: 2023-08-29 | Stop reason: HOSPADM

## 2023-08-28 RX ORDER — POLYETHYLENE GLYCOL 3350 17 G/17G
1 POWDER, FOR SOLUTION ORAL
Status: DISCONTINUED | OUTPATIENT
Start: 2023-08-28 | End: 2023-08-29 | Stop reason: HOSPADM

## 2023-08-28 RX ORDER — ALBUTEROL SULFATE 90 UG/1
2 AEROSOL, METERED RESPIRATORY (INHALATION) EVERY 4 HOURS PRN
Status: DISCONTINUED | OUTPATIENT
Start: 2023-08-28 | End: 2023-08-29 | Stop reason: HOSPADM

## 2023-08-28 RX ORDER — VITAMIN A ACETATE, BETA CAROTENE, ASCORBIC ACID, CHOLECALCIFEROL, .ALPHA.-TOCOPHEROL ACETATE, DL-, THIAMINE MONONITRATE, RIBOFLAVIN, NIACINAMIDE, PYRIDOXINE HYDROCHLORIDE, FOLIC ACID, CYANOCOBALAMIN, CALCIUM CARBONATE, FERROUS FUMARATE, ZINC OXIDE, CUPRIC OXIDE 3080; 12; 120; 400; 1; 1.84; 3; 20; 22; 920; 25; 200; 27; 10; 2 [IU]/1; UG/1; MG/1; [IU]/1; MG/1; MG/1; MG/1; MG/1; MG/1; [IU]/1; MG/1; MG/1; MG/1; MG/1; MG/1
1 TABLET, FILM COATED ORAL DAILY
COMMUNITY

## 2023-08-28 RX ADMIN — IOHEXOL 80 ML: 350 INJECTION, SOLUTION INTRAVENOUS at 14:45

## 2023-08-28 ASSESSMENT — COGNITIVE AND FUNCTIONAL STATUS - GENERAL
MOBILITY SCORE: 24
SUGGESTED CMS G CODE MODIFIER DAILY ACTIVITY: CH
DAILY ACTIVITIY SCORE: 24
SUGGESTED CMS G CODE MODIFIER MOBILITY: CH

## 2023-08-28 ASSESSMENT — ENCOUNTER SYMPTOMS
DIZZINESS: 1
SPEECH CHANGE: 0
SENSORY CHANGE: 0
COUGH: 0
FOCAL WEAKNESS: 0
NAUSEA: 0
SHORTNESS OF BREATH: 1
VOMITING: 0
HEMOPTYSIS: 0

## 2023-08-28 ASSESSMENT — FIBROSIS 4 INDEX
FIB4 SCORE: 0.38
FIB4 SCORE: 0.63

## 2023-08-28 ASSESSMENT — PATIENT HEALTH QUESTIONNAIRE - PHQ9
1. LITTLE INTEREST OR PLEASURE IN DOING THINGS: NOT AT ALL
2. FEELING DOWN, DEPRESSED, IRRITABLE, OR HOPELESS: NOT AT ALL
SUM OF ALL RESPONSES TO PHQ9 QUESTIONS 1 AND 2: 0

## 2023-08-28 ASSESSMENT — LIFESTYLE VARIABLES
TOTAL SCORE: 0
HAVE YOU EVER FELT YOU SHOULD CUT DOWN ON YOUR DRINKING: NO
CONSUMPTION TOTAL: NEGATIVE
ALCOHOL_USE: NO
HAVE PEOPLE ANNOYED YOU BY CRITICIZING YOUR DRINKING: NO
AVERAGE NUMBER OF DAYS PER WEEK YOU HAVE A DRINK CONTAINING ALCOHOL: 0
ON A TYPICAL DAY WHEN YOU DRINK ALCOHOL HOW MANY DRINKS DO YOU HAVE: 0
EVER HAD A DRINK FIRST THING IN THE MORNING TO STEADY YOUR NERVES TO GET RID OF A HANGOVER: NO
EVER FELT BAD OR GUILTY ABOUT YOUR DRINKING: NO
TOTAL SCORE: 0
DOES PATIENT WANT TO STOP DRINKING: NO
TOTAL SCORE: 0
HOW MANY TIMES IN THE PAST YEAR HAVE YOU HAD 5 OR MORE DRINKS IN A DAY: 0

## 2023-08-28 NOTE — ED NOTES
Med rec updated and complete. Allergies reviewed. Confirmed name and date of birth. Pt denies antibiotic use in 30 days.      Home pharmacy    [facility] = [phone]

## 2023-08-28 NOTE — H&P
"Hospital Medicine History & Physical Note    Date of Service  8/28/2023    Primary Care Physician  Osmani Zamora M.D.    Consultants  obstetrics    Specialist Names: Dr Goldman     Code Status  Full Code    Chief Complaint  Chief Complaint   Patient presents with    Chest Pain     \"Tightening when I am breathing in, but today it actually caused pain\".  Pt reports she became very pale at work in front of coworker.  Pt reports near syncope 1 week ago and felt better after fanning self and moving to a cooler area.    Dizziness     Pt reports dizziness since onset of 3rd trimester.      Palpitations     Pt reports intermittent palpitations \"like my heart is racing and I can't get my breathing under control\"    Pregnancy     Approx 35 weeks       History of Presenting Illness  Mar Orr is a 28 y.o. female 35 weeks pregnant with past medical history of asthma who presented 8/28/2023 with 3-week history of intermittent chest pain associate with shortness of breath, palpitation, dizziness.  She describes chest pain is non radiating, substernal, no alleviating or exacerbating factors.  Chest pain does not change with position.  She also reports of few near syncope episode.  Denies any weakness/numbness.  Denies any recent viral infection.  Denies any history of CAD.  Denies any family history of cardiac disease.      On arrival to ED patient vitals remained stable.  Labs reviewed noted to have normal troponin, D-dimer 0.84.  Initial EKG with normal sinus rhythm, no concern for pericarditis/ischemia.  CTA chest reviewed, noted with.    Fetal ultrasound done in the ED which is unremarkable.  OBG has been consulted who will evaluate patient for further recommendation.      No evidence of PE, trace pericardial effusion, clear lungs.      I spoke and discussed the case with the ER physician, Dr. Ga .  Patient will be admitted to the hospital for further evaluation and management for chest pain, " pericardial fusion.  Need further work-up with serial troponin, need echocardiogram.  Need close monitoring on telemetry for chest pain.          I discussed the plan of care with patient and family.    Review of Systems  Review of Systems   Respiratory:  Positive for shortness of breath. Negative for cough and hemoptysis.    Cardiovascular:  Positive for chest pain.   Gastrointestinal:  Negative for nausea and vomiting.   Neurological:  Positive for dizziness. Negative for sensory change, speech change and focal weakness.       Past Medical History   has a past medical history of Asthma, Chronic back pain, and Obesity.    Surgical History   has no past surgical history on file.     Family History  family history includes Arthritis in her mother; Cancer in her maternal grandfather; Diabetes in her maternal grandfather; Lung Disease in her brother; No Known Problems in her father.   Family history reviewed with patient. There is no family history that is pertinent to the chief complaint.     Social History   reports that she has never smoked. She has never used smokeless tobacco. She reports that she does not currently use alcohol. She reports that she does not currently use drugs.    Allergies  No Known Allergies    Medications  Prior to Admission Medications   Prescriptions Last Dose Informant Patient Reported? Taking?   prenatal plus vitamin (STUARTNATAL 1+1) 27-1 MG Tab tablet 8/28/2023 at 0800 Patient Yes Yes   Sig: Take 1 Tablet by mouth every day.      Facility-Administered Medications: None       Physical Exam  Temp:  [36.9 °C (98.5 °F)] 36.9 °C (98.5 °F)  Pulse:  [66-98] 69  Resp:  [16] 16  BP: (100-114)/(64-77) 107/72  SpO2:  [92 %-99 %] 98 %  Blood Pressure: 107/72   Temperature: 36.9 °C (98.5 °F)   Pulse: 69   Respiration: 16   Pulse Oximetry: 98 %       Physical Exam  Constitutional:       Appearance: She is obese.   Cardiovascular:      Rate and Rhythm: Normal rate and regular rhythm.      Pulses:  "Normal pulses.      Heart sounds: Normal heart sounds.   Pulmonary:      Effort: Pulmonary effort is normal. No respiratory distress.      Breath sounds: No wheezing or rales.   Abdominal:      Tenderness: There is no abdominal tenderness.   Musculoskeletal:      Right lower leg: No edema.      Left lower leg: No edema.   Skin:     General: Skin is warm and dry.      Capillary Refill: Capillary refill takes less than 2 seconds.   Neurological:      General: No focal deficit present.      Mental Status: She is alert.   Psychiatric:         Mood and Affect: Mood normal.         Laboratory:  Recent Labs     08/28/23  1250   WBC 9.2   RBC 4.66   HEMOGLOBIN 14.1   HEMATOCRIT 41.5   MCV 89.1   MCH 30.3   MCHC 34.0   RDW 41.3   PLATELETCT 206   MPV 11.2     Recent Labs     08/28/23  1250   SODIUM 136   POTASSIUM 3.6   CHLORIDE 103   CO2 20   GLUCOSE 72   BUN 11   CREATININE 0.54   CALCIUM 9.2     Recent Labs     08/28/23  1250   ALTSGPT 9   ASTSGOT 15   ALKPHOSPHAT 118*   TBILIRUBIN 1.0   GLUCOSE 72         No results for input(s): \"NTPROBNP\" in the last 72 hours.      Recent Labs     08/28/23  1250   TROPONINT <6       Imaging:  CT-CTA CHEST PULMONARY ARTERY W/ RECONS   Final Result         1.  No evidence of pulmonary embolism.   2.  Trace pericardial effusion.   3.  Lungs are clear. No consolidation.      Fleischner Society pulmonary nodule recommendations:         DX-CHEST-PORTABLE (1 VIEW)   Final Result         1. No acute cardiopulmonary abnormalities are identified.      EC-ECHOCARDIOGRAM COMPLETE W/O CONT    (Results Pending)       X-Ray:  I have personally reviewed the images and compared with prior images.  EKG:  I have personally reviewed the images and compared with prior images.    Assessment/Plan:  Justification for Admission Status  I anticipate this patient is appropriate for observation status at this time  for further evaluation and management for chest pain, pericardial fusion.  Need further work-up " with serial troponin, need echocardiogram.  Need close monitoring on telemetry for chest pain.      * Pain in the chest  Assessment & Plan  Unlikely ACS given negative troponin and unremarkable ekg  telemetry monitoring  Follow serial troponin/CK-MB/EKG  Transthoracic echocardiograph to identify regional wall motion abnormalities and assess LV function  Aspirin 325 mg  Check lipid panel  Supplemental oxygen          35 weeks gestation of pregnancy  Assessment & Plan  Fetal ultrasound done in the ED which is unremarkable.  OBG has been consulted who will evaluate patient for further recommendation.    Pericardial effusion  Assessment & Plan  Trace pericardial effusion.  Get Echocardiogram        VTE prophylaxis: SCDs/TEDs    Patient is ambulatory.

## 2023-08-28 NOTE — ED TRIAGE NOTES
"Mar Reid Pettygilberto Orr  28 y.o.  Female  Ambulatory to triage for     Chief Complaint   Patient presents with    Chest Pain     \"Tightening when I am breathing in, but today it actually caused pain\".  Pt reports she became very pale at work in front of coworker.  Pt reports near syncope 1 week ago and felt better after fanning self and moving to a cooler area.    Dizziness     Pt reports dizziness since onset of 3rd trimester.      Palpitations     Pt reports intermittent palpitations \"like my heart is racing and I can't get my breathing under control\"     Pt reports approx 35 weeks pregnant.  Pt denies recent illness.    EKG paged overhead.     /73   Pulse 98   Temp 36.9 °C (98.5 °F) (Temporal)   Resp 16   Ht 1.702 m (5' 7\")   Wt 86.2 kg (190 lb 0.6 oz)   LMP 12/22/2022   SpO2 99%   BMI 29.76 kg/m²     "

## 2023-08-28 NOTE — ASSESSMENT & PLAN NOTE
Fetal ultrasound done in the ED which is unremarkable.  OBG has been consulted who will evaluate patient for further recommendation.

## 2023-08-28 NOTE — ED PROVIDER NOTES
"ED Provider Note    Primary care provider: Osmani Zamora M.D.  Means of arrival: private vehicle  History obtained from: patient  History limited by:  none    CHIEF COMPLAINT  Chief Complaint   Patient presents with    Chest Pain     \"Tightening when I am breathing in, but today it actually caused pain\".  Pt reports she became very pale at work in front of coworker.  Pt reports near syncope 1 week ago and felt better after fanning self and moving to a cooler area.    Dizziness     Pt reports dizziness since onset of 3rd trimester.      Palpitations     Pt reports intermittent palpitations \"like my heart is racing and I can't get my breathing under control\"    Pregnancy     Approx 35 weeks       HPI/ROS  Jackiei Jeanette Orr is a 28 y.o.  at 35 weeks who presents to the Emergency Department for evaluation of chest pain, dizziness and palpitations.  Patient reports that she is 35 weeks pregnant and has been following with obstetrician Dr. Goldman.  Everything has been normal during the pregnancy, this is her first pregnancy.  However since entering her third trimester she states that she has had tightness in her chest that is worse with ambulation and deep breathing.  This has been going on for a few weeks.  She also reports associated dizziness and palpitations.  She states symptoms are worsened with deep inspiration.  She has never had symptoms like this in the past.  She is otherwise generally healthy.  No history of heart disease.    Patient denies any abdominal pain, vaginal bleeding or vaginal discharge.    EXTERNAL RECORDS REVIEWED  Last seen in the emergency department on 8/10/2022 for miscarriage, no recent pertinent visits.      PAST MEDICAL HISTORY   has a past medical history of Asthma, Chronic back pain, and Obesity.    SURGICAL HISTORY  patient denies any surgical history    SOCIAL HISTORY  Social History     Tobacco Use    Smoking status: Never    Smokeless tobacco: Never   Vaping Use " "   Vaping Use: Former   Substance Use Topics    Alcohol use: Not Currently     Comment: Pregnant: occassionally prior to pregnancy    Drug use: Not Currently      Social History     Substance and Sexual Activity   Drug Use Not Currently       FAMILY HISTORY  Family History   Problem Relation Age of Onset    Arthritis Mother     No Known Problems Father     Lung Disease Brother         asthma     Diabetes Maternal Grandfather     Cancer Maternal Grandfather         prostate ca        CURRENT MEDICATIONS  Home Medications       Reviewed by Olayinka Serrato (Pharmacy Tech) on 08/28/23 at 1538  Med List Status: Complete     Medication Last Dose Status   prenatal plus vitamin (STUARTNATAL 1+1) 27-1 MG Tab tablet 8/28/2023 Active                    ALLERGIES  No Known Allergies    PHYSICAL EXAM  VITAL SIGNS: /73   Pulse 98   Temp 36.9 °C (98.5 °F) (Temporal)   Resp 16   Ht 1.702 m (5' 7\")   Wt 86.2 kg (190 lb 0.6 oz)   LMP 12/22/2022   SpO2 99%   BMI 29.76 kg/m²   Vitals reviewed by myself.  Physical Exam  Nursing note and vitals reviewed.  Constitutional: Well-developed and well-nourished. No acute distress.   HENT: Head is normocephalic and atraumatic.  Eyes: extra-ocular movements intact  Cardiovascular: regular rate and  regular rhythm. No murmur heard.  Pulmonary/Chest: Breath sounds normal. No wheezes or rales.   Abdominal: Soft and non-tender. No distention.    Musculoskeletal: Extremities exhibit normal range of motion without edema or tenderness.   Neurological: Awake and alert  Skin: Skin is warm and dry. No rash.         DIAGNOSTIC STUDIES:  LABS  Labs Reviewed   CBC WITH DIFFERENTIAL - Abnormal; Notable for the following components:       Result Value    Neutrophils-Polys 73.50 (*)     Lymphocytes 19.30 (*)     All other components within normal limits    Narrative:     Biotin intake of greater than 5 mg per day may interfere with  troponin levels, causing false low values.   COMP METABOLIC " PANEL - Abnormal; Notable for the following components:    Alkaline Phosphatase 118 (*)     All other components within normal limits    Narrative:     Biotin intake of greater than 5 mg per day may interfere with  troponin levels, causing false low values.   D-DIMER - Abnormal; Notable for the following components:    D-Dimer 0.84 (*)     All other components within normal limits    Narrative:     Biotin intake of greater than 5 mg per day may interfere with  troponin levels, causing false low values.   HCG QUAL SERUM - Abnormal; Notable for the following components:    Beta-Hcg Qualitative Serum Positive (*)     All other components within normal limits    Narrative:     Biotin intake of greater than 5 mg per day may interfere with  troponin levels, causing false low values.   TROPONIN    Narrative:     Biotin intake of greater than 5 mg per day may interfere with  troponin levels, causing false low values.   TROPONIN    Narrative:     Biotin intake of greater than 5 mg per day may interfere with  troponin levels, causing false low values.   ESTIMATED GFR    Narrative:     Biotin intake of greater than 5 mg per day may interfere with  troponin levels, causing false low values.   PROBRAIN NATRIURETIC PEPTIDE, NT    Narrative:     Biotin intake of greater than 5 mg per day may interfere with  troponin levels, causing false low values.   TROPONIN       All labs reviewed and independently interpreted by myself    EKG Interpretation:    12 Lead EKG independently interpreted by myself to show:  EKG at 11:55 AM: Normal sinus rhythm, heart rate 70, normal axis, normal intervals, , QRS 87, QTc 442, no acute ST-T segment changes, no evidence of acute arrhythmia or ischemia per my independent interpretation    RADIOLOGY    Final interpretation by radiology demonstrates:    CT-CTA CHEST PULMONARY ARTERY W/ RECONS   Final Result         1.  No evidence of pulmonary embolism.   2.  Trace pericardial effusion.   3.  Lungs  are clear. No consolidation.      Fleischner Society pulmonary nodule recommendations:         DX-CHEST-PORTABLE (1 VIEW)   Final Result         1. No acute cardiopulmonary abnormalities are identified.        The radiologist's interpretation of all radiological studies have been reviewed by me.      PROCEDURES  Point of Care Ultrasound    ED POINT OF CARE ULTRASOUND: LIMITED TRANSABDOMINAL PELVIC/OB    Indication for Exam: Fetal check in third trimester    Intraunterine pregnancy Identified:Yes  Fetal Movement: Present  Fetal Heart Rate: 136  R. Adnexa: Not assessed  L. Adnexa: Not assessed  Recto-uterine pouch Free Fluid: Not assessed      Image retained through [facility] as seen below:         Additional interpretation: Fetal heart tones present at appropriate rate in the intrauterine space    This study is a limited ultrasound examination performed and interpreted to evaluate for limited conditions as outlined above. There may be other clinically important information contained in the images that is outside this scope. When clinically warranted, a comprehensive ultrasound through the appropriate department is considered.       COURSE & MEDICAL DECISION MAKING    ED Observation Status? no    INITIAL ASSESSMENT AND PLAN    Patient is a 28-year-old female who comes in for evaluation of palpitations, dizziness and chest tightness.  Differential diagnosis includes arrhythmia, normal pregnancy, dehydration, anemia of pregnancy, pulmonary embolism, ACS.  Given the duration of her symptoms I have lower suspicion for ACS, however will obtain screening EKG and troponin.  Further diagnostic work-up includes labs and chest x-ray.    ER COURSE AND FINAL DISPOSITION   Patient's initial vitals are within normal limits.  She is well-appearing on exam.  As she is 35 weeks pregnant I did perform a point-of-care ultrasound which demonstrated normal fetal heart tones at 136.  She has no abdominal complaints at this time and is not  hypertensive therefore no emergent need for obstetrics, will await work-up until contacting OB.    EKG returns and demonstrates no evidence of acute arrhythmia or ischemia.  Results returned and are independently interpreted by myself to demonstrate:  -Patient's D-dimer is slightly elevated at 0.84, will obtain a CTA of the chest to assess for pulmonary embolism given her pleuritic chest pain concerning for possible pulmonary embolism in the setting of pregnancy.  I discussed risks and benefits of radiation to the baby with the patient and she is amenable to this  - Troponin is within normal limits, patient has a heart score of 0 making her low risk for ACS  -Electrolytes and renal function are within normal limits  -Labs otherwise unremarkable    CT returns and demonstrates no evidence of pulmonary embolism, however she does have a trace pericardial effusion.  Given that she is pregnant and having pleuritic chest pain I am concerned about cardiomyopathy of pregnancy.  I discussed the case with patient's obstetrician Dr. Goldman who will consult on the patient but agrees that patient to be hospitalized for further cardiac work-up.  I discussed the case with Dr. Vicente who has accepted patient for hospitalization.  Patient is in guarded condition.    ADDITIONAL PROBLEM LIST AND RESOURCE UTILIZATION    Additional problems aside from the chief complaint that I have addressed: none    I have discussed management of the patient with the following physicians and LORENA's: Dr. Goldman,     Discussion of management with other [facility] or appropriate source(s): none     Escalation of care considered, and ultimately not performed: see above.     Barriers to care at this time, including but not limited to: none.     Decision tools and prescription drugs considered including, but not limited to: HEART Score 0 .    Please see review of records as noted above      FINAL IMPRESSION  1. Chest pain, unspecified type    2. Pericardial effusion

## 2023-08-28 NOTE — ASSESSMENT & PLAN NOTE
Unlikely ACS given negative troponin and unremarkable ekg  telemetry monitoring  Follow serial troponin/CK-MB/EKG  Transthoracic echocardiograph to identify regional wall motion abnormalities and assess LV function  Aspirin 325 mg  Check lipid panel  Supplemental oxygen

## 2023-08-29 ENCOUNTER — PATIENT OUTREACH (OUTPATIENT)
Dept: SCHEDULING | Facility: IMAGING CENTER | Age: 28
End: 2023-08-29
Payer: MEDICAID

## 2023-08-29 ENCOUNTER — APPOINTMENT (OUTPATIENT)
Dept: CARDIOLOGY | Facility: MEDICAL CENTER | Age: 28
End: 2023-08-29
Attending: STUDENT IN AN ORGANIZED HEALTH CARE EDUCATION/TRAINING PROGRAM
Payer: MEDICAID

## 2023-08-29 VITALS
BODY MASS INDEX: 29.83 KG/M2 | WEIGHT: 190.04 LBS | OXYGEN SATURATION: 96 % | SYSTOLIC BLOOD PRESSURE: 96 MMHG | DIASTOLIC BLOOD PRESSURE: 62 MMHG | HEIGHT: 67 IN | RESPIRATION RATE: 16 BRPM | HEART RATE: 67 BPM | TEMPERATURE: 97.5 F

## 2023-08-29 LAB
ANION GAP SERPL CALC-SCNC: 11 MMOL/L (ref 7–16)
BUN SERPL-MCNC: 12 MG/DL (ref 8–22)
CALCIUM SERPL-MCNC: 8.1 MG/DL (ref 8.5–10.5)
CHLORIDE SERPL-SCNC: 107 MMOL/L (ref 96–112)
CHOLEST SERPL-MCNC: 202 MG/DL (ref 100–199)
CO2 SERPL-SCNC: 20 MMOL/L (ref 20–33)
CREAT SERPL-MCNC: 0.53 MG/DL (ref 0.5–1.4)
ERYTHROCYTE [DISTWIDTH] IN BLOOD BY AUTOMATED COUNT: 40.7 FL (ref 35.9–50)
GFR SERPLBLD CREATININE-BSD FMLA CKD-EPI: 131 ML/MIN/1.73 M 2
GLUCOSE SERPL-MCNC: 84 MG/DL (ref 65–99)
HCT VFR BLD AUTO: 33.8 % (ref 37–47)
HDLC SERPL-MCNC: 52 MG/DL
HGB BLD-MCNC: 11.6 G/DL (ref 12–16)
LDLC SERPL CALC-MCNC: 130 MG/DL
LV EJECT FRACT  99904: 60
LV EJECT FRACT MOD 4C 99902: 60.33
MCH RBC QN AUTO: 30.9 PG (ref 27–33)
MCHC RBC AUTO-ENTMCNC: 34.3 G/DL (ref 32.2–35.5)
MCV RBC AUTO: 89.9 FL (ref 81.4–97.8)
PLATELET # BLD AUTO: 163 K/UL (ref 164–446)
PMV BLD AUTO: 10.8 FL (ref 9–12.9)
POTASSIUM SERPL-SCNC: 3.7 MMOL/L (ref 3.6–5.5)
RBC # BLD AUTO: 3.76 M/UL (ref 4.2–5.4)
SODIUM SERPL-SCNC: 138 MMOL/L (ref 135–145)
TRIGL SERPL-MCNC: 98 MG/DL (ref 0–149)
TROPONIN T SERPL-MCNC: <6 NG/L (ref 6–19)
WBC # BLD AUTO: 9.9 K/UL (ref 4.8–10.8)

## 2023-08-29 PROCEDURE — G0378 HOSPITAL OBSERVATION PER HR: HCPCS

## 2023-08-29 PROCEDURE — 80061 LIPID PANEL: CPT

## 2023-08-29 PROCEDURE — 36415 COLL VENOUS BLD VENIPUNCTURE: CPT

## 2023-08-29 PROCEDURE — 93306 TTE W/DOPPLER COMPLETE: CPT | Mod: 26 | Performed by: INTERNAL MEDICINE

## 2023-08-29 PROCEDURE — 93306 TTE W/DOPPLER COMPLETE: CPT

## 2023-08-29 PROCEDURE — 85027 COMPLETE CBC AUTOMATED: CPT

## 2023-08-29 PROCEDURE — 80048 BASIC METABOLIC PNL TOTAL CA: CPT

## 2023-08-29 PROCEDURE — 59025 FETAL NON-STRESS TEST: CPT

## 2023-08-29 PROCEDURE — 99239 HOSP IP/OBS DSCHRG MGMT >30: CPT | Performed by: STUDENT IN AN ORGANIZED HEALTH CARE EDUCATION/TRAINING PROGRAM

## 2023-08-29 PROCEDURE — 84484 ASSAY OF TROPONIN QUANT: CPT

## 2023-08-29 RX ORDER — ALBUTEROL SULFATE 90 UG/1
2 AEROSOL, METERED RESPIRATORY (INHALATION) EVERY 4 HOURS PRN
Qty: 8.5 G | Refills: 0 | Status: SHIPPED | OUTPATIENT
Start: 2023-08-29

## 2023-08-29 NOTE — HOSPITAL COURSE
Mar Orr is a 28 y.o. female 35 weeks pregnant with past medical history of asthma who presented 8/28/2023 with 3-week history of intermittent chest pain associate with shortness of breath, palpitation, dizziness.  She describes chest pain is non radiating, substernal, no alleviating or exacerbating factors.  Chest pain does not change with position.  She also reports of few near syncope episode.  Denies any weakness/numbness.  Denies any recent viral infection.  Denies any history of CAD.  Denies any family history of cardiac disease.        On arrival to ED patient vitals remained stable.  Labs reviewed noted to have normal troponin, D-dimer 0.84.  Initial EKG with normal sinus rhythm, no concern for pericarditis/ischemia.  CTA chest reviewed, noted with.     Fetal ultrasound done in the ED which is unremarkable.  OBG has been consulted who will evaluate patient for further recommendation.

## 2023-08-29 NOTE — PROGRESS NOTES
This RN to bedside for NST. Pt states + FM, denies LOF, VB or UCs. States she has occasional cramping and that she has for a while, nothing new, changed, or painful. Reactive NST obtained. No other needs at this time.

## 2023-08-29 NOTE — CARE PLAN
The patient is Watcher - Medium risk of patient condition declining or worsening    Shift Goals  Clinical Goals: control cp, monitor labs  Patient Goals: comfort, rest  Family Goals: maintain pt stability    Progress made toward(s) clinical / shift goals:    Problem: Pain - Standard  Goal: Alleviation of pain or a reduction in pain to the patient’s comfort goal  Outcome: Progressing  Note: Declines need for intervention     Problem: Knowledge Deficit - Standard  Goal: Patient and family/care givers will demonstrate understanding of plan of care, disease process/condition, diagnostic tests and medications  Outcome: Progressing  Note: White board updated with POC and care team information during bedside report.

## 2023-08-29 NOTE — PROGRESS NOTES
Assumed care of patient. A&Ox4 and resting comfortably in bed. Denies pain or any needs at this time. Updated on plan of care. Will continue to monitor.

## 2023-08-29 NOTE — DISCHARGE SUMMARY
"Discharge Summary    CHIEF COMPLAINT ON ADMISSION  Chief Complaint   Patient presents with    Chest Pain     \"Tightening when I am breathing in, but today it actually caused pain\".  Pt reports she became very pale at work in front of coworker.  Pt reports near syncope 1 week ago and felt better after fanning self and moving to a cooler area.    Dizziness     Pt reports dizziness since onset of 3rd trimester.      Palpitations     Pt reports intermittent palpitations \"like my heart is racing and I can't get my breathing under control\"    Pregnancy     Approx 35 weeks       Reason for Admission  Pregnancy/Chest Pain/SOB     Admission Date  8/28/2023    CODE STATUS  Full Code    HPI & HOSPITAL COURSE  This is a 28 y.o. female here with chest pain  Mar Orr is a 28 y.o. female 35 weeks pregnant with past medical history of asthma who presented 8/28/2023 with 3-week history of intermittent chest pain associate with shortness of breath, palpitation, dizziness.  She describes chest pain is non radiating, substernal, no alleviating or exacerbating factors.  Chest pain does not change with position.  She also reports of few near syncope episode.  Denies any weakness/numbness.  Denies any recent viral infection.  Denies any history of CAD.  Denies any family history of cardiac disease.        On arrival to ED patient vitals remained stable.  Labs reviewed noted to have normal troponin, D-dimer 0.84.  Initial EKG with normal sinus rhythm, no concern for pericarditis/ischemia.  CTA chest reviewed, noted with.     Fetal ultrasound done in the ED which is unremarkable.  OBG has been consulted who will evaluate patient for further recommendation.  Patient had repeat trended troponins all of which were completely negative.  Appreciate obstetrics recommendations.  Patient had no evidence of pulmonary emboli on CTA of the chest.  There was some suggested evidence of trace pericardial effusion however " echocardiogram was within acceptable limits.  I had discussion with patient's obstetrician who recommended follow-up in her clinic as per usual.  All necessary medications provided to patient prior to discharge.    Patient was counseled that should her symptoms return suddenly worsen to go to the nearest emergency department.    UPDATE:  Called patient at home, 08/29/2023 @2020, regarding echocardiogram results indicating small pericardial effusion. Patient firmly denied chest pain or shortness of breath counseled patient that she needs to follow up with her primary care provider within 2-3 days closely patient endorsed understanding and affirmed she would schedule appointment. Lastly, counseled patient that should she experience chest pain or shortness of breath to immediately go to nearest ER patient endorsed understanding.    Therefore, she is discharged in good and stable condition to home with close outpatient follow-up.    The patient recovered much more quickly than anticipated on admission.    Discharge Date  8/29/2023    FOLLOW UP ITEMS POST DISCHARGE  Take all medication as prescribed  Go to all follow-up appointments as indicated      DISCHARGE DIAGNOSES  Principal Problem:    Pain in the chest (POA: Unknown)  Active Problems:    Pericardial effusion (POA: Unknown)    Chest pain, atypical (POA: Yes)    35 weeks gestation of pregnancy (POA: Unknown)  Resolved Problems:    * No resolved hospital problems. *      FOLLOW UP    Osmani Zamora M.D.  [address]  [facility] [address]  [place] [place] [id number] [phone]    Follow up in 1 week(s)  Left message with Osmani Zamora M.D. office. The  will call you to schedule your follow up appointment. If you do not hear in 1 week please call the office to schedule.      MEDICATIONS ON DISCHARGE     Medication List        CONTINUE taking these medications        Instructions   albuterol 108 (90 Base) MCG/ACT Aers inhalation aerosol   Inhale 2 Puffs every  four hours as needed for Shortness of Breath.  Dose: 2 Puff     prenatal plus vitamin 27-1 MG Tabs tablet   Take 1 Tablet by mouth every day.  Dose: 1 Tablet              Allergies  No Known Allergies    DIET  Orders Placed This Encounter   Procedures    Diet Order Diet: Cardiac; Miscellaneous modifications: (optional): No Decaf, No Caffeine(for test)     Standing Status:   Standing     Number of Occurrences:   1     Order Specific Question:   Diet:     Answer:   Cardiac [6]     Order Specific Question:   Miscellaneous modifications: (optional)     Answer:   No Decaf, No Caffeine(for test) [11]       ACTIVITY  As tolerated.  Weight bearing as tolerated    CONSULTATIONS  Obstetrics    PROCEDURES  None    LABORATORY  Lab Results   Component Value Date    SODIUM 138 08/29/2023    POTASSIUM 3.7 08/29/2023    CHLORIDE 107 08/29/2023    CO2 20 08/29/2023    GLUCOSE 84 08/29/2023    BUN 12 08/29/2023    CREATININE 0.53 08/29/2023        Lab Results   Component Value Date    WBC 9.9 08/29/2023    HEMOGLOBIN 11.6 (L) 08/29/2023    HEMATOCRIT 33.8 (L) 08/29/2023    PLATELETCT 163 (L) 08/29/2023      Please note that this dictation was created using voice recognition software. I have made every reasonable attempt to correct obvious errors, but I expect that there are errors of grammar and possibly context that I did not discover before finalizing the note.    Total time of the discharge process exceeds 35 minutes.

## 2023-08-29 NOTE — H&P
"History and Physical      Mar Jeanette Orr is a 28 y.o. female  at 35w1d who presents for:  Chest pain with SOB  Dizziness  Palpitations    Pt was at work today when she became very pale and dizzy. Felt like she was going to pass out and had continued chest pain with SOB. Pt has been having palpitations and SOB that would last for a short period of time for the past 4 weeks but was very limited and sporadic until today. She did not have LOC.     Denies any CTXs, LOF or VB. Good FM.    ROS: Pertinent items are noted in HPI.    Past Medical History:   Diagnosis Date    Asthma     Chronic back pain     Obesity      PSH: Gastric Sleeve  Family History   Problem Relation Age of Onset    Arthritis Mother     No Known Problems Father     Lung Disease Brother         asthma     Diabetes Maternal Grandfather     Cancer Maternal Grandfather         prostate ca      Social History     Tobacco Use    Smoking status: Never    Smokeless tobacco: Never   Vaping Use    Vaping Use: Former   Substance Use Topics    Alcohol use: Not Currently     Comment: Pregnant: occassionally prior to pregnancy    Drug use: Not Currently     Allergies: Patient has no known allergies.    Prenatal care with Goldman starting at 1st trimester with following problems: None until now with Chest Pain and pericardial effusion    Objective:      /72   Pulse (!) 103   Temp 36.8 °C (98.2 °F) (Temporal)   Resp 15   Ht 1.702 m (5' 7\")   Wt 86.2 kg (190 lb 0.6 oz)   SpO2 96%     General:   no acute distress, alert, cooperative   Skin:   normal   HEENT:  Neck supple with midline trachea   Lungs:   CTA bilateral   Heart:   regular rate and rhythm   Abdomen:   gravid, NT   EFW:  5 lbs   Pelvis:  adequate with gynecoid pelvis   FHT:  148 BPM   Uterine Size: S=D   EXT: NT, no edema       Lab Review  Recent Results (from the past 5880 hour(s))   Quantiferon Gold Plus TB (4 tube)    Collection Time: 23  9:00 AM   Result Value Ref " Range    QFT NIL 0.03 IU/mL    QFT TB1 - NIL 0.01 <=0.34 IU/mL    QFT TB2 - NIL 0.01 <=0.34 IU/mL    QFT Mitogen - NIL 5.69 IU/mL    QFT Gold Plus Negative Negative   EKG    Collection Time: 23 11:55 AM   Result Value Ref Range    Report       [facility]    Test Date:  2023  Pt Name:    TANJA HIDALGO    Department: ER  MRN:        [id number]                      Room:  Gender:     Female                       Technician: [facility]  :        1995                   Requested By:ER TRIAGE PROTOCOL  Order #:    [id number]                    Reading MD: Bridgette Ga MD    Measurements  Intervals                                Axis  Rate:       70                           P:          74  KS:         156                          QRS:        54  QRSD:       87                           T:          56  QT:         409  QTc:        442    Interpretive Statements  Sinus rhythm  Probable left atrial enlargement  No previous ECG available for comparison  , ,  Electronically Signed On 2023 13:08:32 PDT by Bridgette Ga MD     CBC with Differential    Collection Time: 23 12:50 PM   Result Value Ref Range    WBC 9.2 4.8 - 10.8 K/uL    RBC 4.66 4.20 - 5.40 M/uL    Hemoglobin 14.1 12.0 - 16.0 g/dL    Hematocrit 41.5 37.0 - 47.0 %    MCV 89.1 81.4 - 97.8 fL    MCH 30.3 27.0 - 33.0 pg    MCHC 34.0 32.2 - 35.5 g/dL    RDW 41.3 35.9 - 50.0 fL    Platelet Count 206 164 - 446 K/uL    MPV 11.2 9.0 - 12.9 fL    Neutrophils-Polys 73.50 (H) 44.00 - 72.00 %    Lymphocytes 19.30 (L) 22.00 - 41.00 %    Monocytes 6.30 0.00 - 13.40 %    Eosinophils 0.30 0.00 - 6.90 %    Basophils 0.20 0.00 - 1.80 %    Immature Granulocytes 0.40 0.00 - 0.90 %    Nucleated RBC 0.00 0.00 - 0.20 /100 WBC    Neutrophils (Absolute) 6.72 1.82 - 7.42 K/uL    Lymphs (Absolute) 1.77 1.00 - 4.80 K/uL    Monos (Absolute) 0.58 0.00 - 0.85 K/uL    Eos (Absolute) 0.03 0.00 - 0.51 K/uL    Baso (Absolute)  0.02 0.00 - 0.12 K/uL    Immature Granulocytes (abs) 0.04 0.00 - 0.11 K/uL    NRBC (Absolute) 0.00 K/uL   Complete Metabolic Panel (CMP)    Collection Time: 08/28/23 12:50 PM   Result Value Ref Range    Sodium 136 135 - 145 mmol/L    Potassium 3.6 3.6 - 5.5 mmol/L    Chloride 103 96 - 112 mmol/L    Co2 20 20 - 33 mmol/L    Anion Gap 13.0 7.0 - 16.0    Glucose 72 65 - 99 mg/dL    Bun 11 8 - 22 mg/dL    Creatinine 0.54 0.50 - 1.40 mg/dL    Calcium 9.2 8.5 - 10.5 mg/dL    Correct Calcium 9.3 8.5 - 10.5 mg/dL    AST(SGOT) 15 12 - 45 U/L    ALT(SGPT) 9 2 - 50 U/L    Alkaline Phosphatase 118 (H) 30 - 99 U/L    Total Bilirubin 1.0 0.1 - 1.5 mg/dL    Albumin 3.9 3.2 - 4.9 g/dL    Total Protein 7.0 6.0 - 8.2 g/dL    Globulin 3.1 1.9 - 3.5 g/dL    A-G Ratio 1.3 g/dL   Troponin STAT    Collection Time: 08/28/23 12:50 PM   Result Value Ref Range    Troponin T <6 6 - 19 ng/L   D-DIMER    Collection Time: 08/28/23 12:50 PM   Result Value Ref Range    D-Dimer 0.84 (H) 0.00 - 0.50 ug/mL (FEU)   BETA-HCG QUALITATIVE SERUM    Collection Time: 08/28/23 12:50 PM   Result Value Ref Range    Beta-Hcg Qualitative Serum Positive (A) Negative   ESTIMATED GFR    Collection Time: 08/28/23 12:50 PM   Result Value Ref Range    GFR (CKD-EPI) 130 >60 mL/min/1.73 m 2   Troponin in two (2) hours    Collection Time: 08/28/23  4:15 PM   Result Value Ref Range    Troponin T <6 6 - 19 ng/L   proBrain Natriuretic Peptide, NT    Collection Time: 08/28/23  4:15 PM   Result Value Ref Range    NT-proBNP 49 0 - 125 pg/mL     CT-CTA CHEST PULMONARY ARTERY W/ RECONS   Final Result           1.  No evidence of pulmonary embolism.   2.  Trace pericardial effusion.   3.  Lungs are clear. No consolidation.       Fleischner Society pulmonary nodule recommendations:           DX-CHEST-PORTABLE (1 VIEW)   Final Result           1. No acute cardiopulmonary abnormalities are identified.     Assessment:   Mar Orr at 35w1d  Trace Pericardial  Effusion     Plan:   Pt has been admitted for Observation for further cardiac workup  Pt noted to have small pericardial effusion on workup to rule out PE  Will have Echocardiogram performed tomorrow   If there is any cardiac defect or abnormality - discussed delivery by  to avoid maternal cardiac stress  Pt desires delivery by  even if no cardiac issues  NST Q day ordered  No obstetrical issues at this time  Will continue to follow  Appreciate management by Hospitalist team for cardiac workup

## 2023-08-29 NOTE — PROGRESS NOTES
This RN to bedside for NST. Patient feels occasional cramping, denies regular uterine contractions. Denies LOF/VB, states normal FM. Reactive NST obtained. Denies needs form OB at this time.

## 2023-08-29 NOTE — DISCHARGE PLANNING
Case Management Discharge Planning    Admission Date: 8/28/2023  GMLOS:    ALOS: 0    6-Clicks ADL Score: 24  6-Clicks Mobility Score: 24      Anticipated Discharge Dispo:  home self care    DME Needed: No    Action(s) Taken: LMSW spoke with pt regarding DC possibility for today.   Pt states she has a ride if she leaves today.  No other CM needs.     Escalations Completed: None

## 2023-08-30 NOTE — PROGRESS NOTES
Pt discharged to home with family via walking. All belongings sent with patient. AVS discussed with patient and she expresses understanding. All questions answered.

## 2023-09-20 ENCOUNTER — APPOINTMENT (OUTPATIENT)
Dept: ADMISSIONS | Facility: MEDICAL CENTER | Age: 28
End: 2023-09-20
Attending: OBSTETRICS & GYNECOLOGY
Payer: MEDICAID

## 2023-09-21 ENCOUNTER — PRE-ADMISSION TESTING (OUTPATIENT)
Dept: ADMISSIONS | Facility: MEDICAL CENTER | Age: 28
End: 2023-09-21
Attending: OBSTETRICS & GYNECOLOGY
Payer: MEDICAID

## 2023-09-24 ENCOUNTER — HOSPITAL ENCOUNTER (INPATIENT)
Facility: MEDICAL CENTER | Age: 28
LOS: 3 days | End: 2023-09-27
Attending: OBSTETRICS & GYNECOLOGY | Admitting: OBSTETRICS & GYNECOLOGY
Payer: MEDICAID

## 2023-09-24 ENCOUNTER — ANESTHESIA (OUTPATIENT)
Dept: OBGYN | Facility: MEDICAL CENTER | Age: 28
End: 2023-09-24
Payer: MEDICAID

## 2023-09-24 ENCOUNTER — ANESTHESIA EVENT (OUTPATIENT)
Dept: OBGYN | Facility: MEDICAL CENTER | Age: 28
End: 2023-09-24
Payer: MEDICAID

## 2023-09-24 DIAGNOSIS — G89.18 POSTOPERATIVE PAIN: ICD-10-CM

## 2023-09-24 LAB
BASOPHILS # BLD AUTO: 0.2 % (ref 0–1.8)
BASOPHILS # BLD: 0.02 K/UL (ref 0–0.12)
EOSINOPHIL # BLD AUTO: 0.03 K/UL (ref 0–0.51)
EOSINOPHIL NFR BLD: 0.4 % (ref 0–6.9)
ERYTHROCYTE [DISTWIDTH] IN BLOOD BY AUTOMATED COUNT: 39.6 FL (ref 35.9–50)
HCT VFR BLD AUTO: 38.9 % (ref 37–47)
HGB BLD-MCNC: 13.2 G/DL (ref 12–16)
HOLDING TUBE BB 8507: NORMAL
IMM GRANULOCYTES # BLD AUTO: 0.04 K/UL (ref 0–0.11)
IMM GRANULOCYTES NFR BLD AUTO: 0.5 % (ref 0–0.9)
LYMPHOCYTES # BLD AUTO: 2.14 K/UL (ref 1–4.8)
LYMPHOCYTES NFR BLD: 25.5 % (ref 22–41)
MCH RBC QN AUTO: 29.7 PG (ref 27–33)
MCHC RBC AUTO-ENTMCNC: 33.9 G/DL (ref 32.2–35.5)
MCV RBC AUTO: 87.4 FL (ref 81.4–97.8)
MONOCYTES # BLD AUTO: 0.5 K/UL (ref 0–0.85)
MONOCYTES NFR BLD AUTO: 6 % (ref 0–13.4)
NEUTROPHILS # BLD AUTO: 5.67 K/UL (ref 1.82–7.42)
NEUTROPHILS NFR BLD: 67.4 % (ref 44–72)
NRBC # BLD AUTO: 0 K/UL
NRBC BLD-RTO: 0 /100 WBC (ref 0–0.2)
PLATELET # BLD AUTO: 198 K/UL (ref 164–446)
PMV BLD AUTO: 11.2 FL (ref 9–12.9)
RBC # BLD AUTO: 4.45 M/UL (ref 4.2–5.4)
T PALLIDUM AB SER QL IA: NORMAL
WBC # BLD AUTO: 8.4 K/UL (ref 4.8–10.8)

## 2023-09-24 PROCEDURE — 160035 HCHG PACU - 1ST 60 MINS PHASE I: Performed by: OBSTETRICS & GYNECOLOGY

## 2023-09-24 PROCEDURE — 160041 HCHG SURGERY MINUTES - EA ADDL 1 MIN LEVEL 4: Performed by: OBSTETRICS & GYNECOLOGY

## 2023-09-24 PROCEDURE — 700111 HCHG RX REV CODE 636 W/ 250 OVERRIDE (IP): Performed by: ANESTHESIOLOGY

## 2023-09-24 PROCEDURE — 160009 HCHG ANES TIME/MIN: Performed by: OBSTETRICS & GYNECOLOGY

## 2023-09-24 PROCEDURE — 86780 TREPONEMA PALLIDUM: CPT

## 2023-09-24 PROCEDURE — 36415 COLL VENOUS BLD VENIPUNCTURE: CPT

## 2023-09-24 PROCEDURE — 700111 HCHG RX REV CODE 636 W/ 250 OVERRIDE (IP): Performed by: OBSTETRICS & GYNECOLOGY

## 2023-09-24 PROCEDURE — 700105 HCHG RX REV CODE 258: Performed by: ANESTHESIOLOGY

## 2023-09-24 PROCEDURE — 770002 HCHG ROOM/CARE - OB PRIVATE (112)

## 2023-09-24 PROCEDURE — A9270 NON-COVERED ITEM OR SERVICE: HCPCS | Performed by: ANESTHESIOLOGY

## 2023-09-24 PROCEDURE — 85025 COMPLETE CBC W/AUTO DIFF WBC: CPT

## 2023-09-24 PROCEDURE — C1755 CATHETER, INTRASPINAL: HCPCS | Performed by: OBSTETRICS & GYNECOLOGY

## 2023-09-24 PROCEDURE — 700111 HCHG RX REV CODE 636 W/ 250 OVERRIDE (IP)

## 2023-09-24 PROCEDURE — 700105 HCHG RX REV CODE 258: Performed by: OBSTETRICS & GYNECOLOGY

## 2023-09-24 PROCEDURE — 700105 HCHG RX REV CODE 258

## 2023-09-24 PROCEDURE — 700102 HCHG RX REV CODE 250 W/ 637 OVERRIDE(OP): Performed by: ANESTHESIOLOGY

## 2023-09-24 PROCEDURE — 700101 HCHG RX REV CODE 250: Performed by: ANESTHESIOLOGY

## 2023-09-24 PROCEDURE — 160002 HCHG RECOVERY MINUTES (STAT): Performed by: OBSTETRICS & GYNECOLOGY

## 2023-09-24 PROCEDURE — A9270 NON-COVERED ITEM OR SERVICE: HCPCS | Performed by: OBSTETRICS & GYNECOLOGY

## 2023-09-24 PROCEDURE — 160048 HCHG OR STATISTICAL LEVEL 1-5: Performed by: OBSTETRICS & GYNECOLOGY

## 2023-09-24 PROCEDURE — 160029 HCHG SURGERY MINUTES - 1ST 30 MINS LEVEL 4: Performed by: OBSTETRICS & GYNECOLOGY

## 2023-09-24 PROCEDURE — 700102 HCHG RX REV CODE 250 W/ 637 OVERRIDE(OP): Performed by: OBSTETRICS & GYNECOLOGY

## 2023-09-24 RX ORDER — KETOROLAC TROMETHAMINE 30 MG/ML
INJECTION, SOLUTION INTRAMUSCULAR; INTRAVENOUS PRN
Status: DISCONTINUED | OUTPATIENT
Start: 2023-09-24 | End: 2023-09-24 | Stop reason: SURG

## 2023-09-24 RX ORDER — DIPHENHYDRAMINE HCL 25 MG
25 TABLET ORAL EVERY 6 HOURS PRN
Status: DISCONTINUED | OUTPATIENT
Start: 2023-09-25 | End: 2023-09-27 | Stop reason: HOSPADM

## 2023-09-24 RX ORDER — OXYCODONE HYDROCHLORIDE 10 MG/1
10 TABLET ORAL EVERY 4 HOURS PRN
Status: DISCONTINUED | OUTPATIENT
Start: 2023-09-24 | End: 2023-09-25

## 2023-09-24 RX ORDER — EPHEDRINE SULFATE 50 MG/ML
10 INJECTION, SOLUTION INTRAVENOUS
Status: ACTIVE | OUTPATIENT
Start: 2023-09-24 | End: 2023-09-24

## 2023-09-24 RX ORDER — ONDANSETRON 4 MG/1
4 TABLET, ORALLY DISINTEGRATING ORAL EVERY 6 HOURS PRN
Status: DISCONTINUED | OUTPATIENT
Start: 2023-09-25 | End: 2023-09-27 | Stop reason: HOSPADM

## 2023-09-24 RX ORDER — DIPHENHYDRAMINE HYDROCHLORIDE 50 MG/ML
12.5 INJECTION INTRAMUSCULAR; INTRAVENOUS
Status: DISCONTINUED | OUTPATIENT
Start: 2023-09-24 | End: 2023-09-24

## 2023-09-24 RX ORDER — HYDROMORPHONE HYDROCHLORIDE 1 MG/ML
0.2 INJECTION, SOLUTION INTRAMUSCULAR; INTRAVENOUS; SUBCUTANEOUS
Status: DISCONTINUED | OUTPATIENT
Start: 2023-09-24 | End: 2023-09-25

## 2023-09-24 RX ORDER — HYDROMORPHONE HYDROCHLORIDE 1 MG/ML
0.4 INJECTION, SOLUTION INTRAMUSCULAR; INTRAVENOUS; SUBCUTANEOUS
Status: DISCONTINUED | OUTPATIENT
Start: 2023-09-24 | End: 2023-09-24

## 2023-09-24 RX ORDER — ONDANSETRON 2 MG/ML
4 INJECTION INTRAMUSCULAR; INTRAVENOUS EVERY 6 HOURS PRN
Status: DISCONTINUED | OUTPATIENT
Start: 2023-09-25 | End: 2023-09-27 | Stop reason: HOSPADM

## 2023-09-24 RX ORDER — DIPHENHYDRAMINE HYDROCHLORIDE 50 MG/ML
25 INJECTION INTRAMUSCULAR; INTRAVENOUS EVERY 6 HOURS PRN
Status: DISCONTINUED | OUTPATIENT
Start: 2023-09-24 | End: 2023-09-25

## 2023-09-24 RX ORDER — HYDRALAZINE HYDROCHLORIDE 20 MG/ML
5 INJECTION INTRAMUSCULAR; INTRAVENOUS
Status: DISCONTINUED | OUTPATIENT
Start: 2023-09-24 | End: 2023-09-24

## 2023-09-24 RX ORDER — ONDANSETRON 2 MG/ML
4 INJECTION INTRAMUSCULAR; INTRAVENOUS
Status: DISCONTINUED | OUTPATIENT
Start: 2023-09-24 | End: 2023-09-24

## 2023-09-24 RX ORDER — HALOPERIDOL 5 MG/ML
1 INJECTION INTRAMUSCULAR
Status: DISCONTINUED | OUTPATIENT
Start: 2023-09-24 | End: 2023-09-24

## 2023-09-24 RX ORDER — CEFAZOLIN SODIUM 1 G/3ML
2 INJECTION, POWDER, FOR SOLUTION INTRAMUSCULAR; INTRAVENOUS ONCE
Status: COMPLETED | OUTPATIENT
Start: 2023-09-24 | End: 2023-09-24

## 2023-09-24 RX ORDER — SODIUM CHLORIDE, SODIUM LACTATE, POTASSIUM CHLORIDE, CALCIUM CHLORIDE 600; 310; 30; 20 MG/100ML; MG/100ML; MG/100ML; MG/100ML
INJECTION, SOLUTION INTRAVENOUS CONTINUOUS
Status: DISCONTINUED | OUTPATIENT
Start: 2023-09-24 | End: 2023-09-24

## 2023-09-24 RX ORDER — SODIUM CHLORIDE, SODIUM GLUCONATE, SODIUM ACETATE, POTASSIUM CHLORIDE AND MAGNESIUM CHLORIDE 526; 502; 368; 37; 30 MG/100ML; MG/100ML; MG/100ML; MG/100ML; MG/100ML
INJECTION, SOLUTION INTRAVENOUS
Status: DISCONTINUED | OUTPATIENT
Start: 2023-09-24 | End: 2023-09-24 | Stop reason: SURG

## 2023-09-24 RX ORDER — ACETAMINOPHEN 500 MG
1000 TABLET ORAL EVERY 6 HOURS PRN
Status: DISCONTINUED | OUTPATIENT
Start: 2023-09-28 | End: 2023-09-27 | Stop reason: HOSPADM

## 2023-09-24 RX ORDER — BUPIVACAINE HYDROCHLORIDE 7.5 MG/ML
INJECTION, SOLUTION INTRASPINAL PRN
Status: DISCONTINUED | OUTPATIENT
Start: 2023-09-24 | End: 2023-09-24 | Stop reason: SURG

## 2023-09-24 RX ORDER — MISOPROSTOL 200 UG/1
800 TABLET ORAL
Status: DISCONTINUED | OUTPATIENT
Start: 2023-09-24 | End: 2023-09-27 | Stop reason: HOSPADM

## 2023-09-24 RX ORDER — DIPHENHYDRAMINE HYDROCHLORIDE 50 MG/ML
25 INJECTION INTRAMUSCULAR; INTRAVENOUS EVERY 6 HOURS PRN
Status: DISCONTINUED | OUTPATIENT
Start: 2023-09-25 | End: 2023-09-27 | Stop reason: HOSPADM

## 2023-09-24 RX ORDER — ONDANSETRON 2 MG/ML
4 INJECTION INTRAMUSCULAR; INTRAVENOUS EVERY 6 HOURS PRN
Status: DISCONTINUED | OUTPATIENT
Start: 2023-09-24 | End: 2023-09-25

## 2023-09-24 RX ORDER — DIPHENHYDRAMINE HYDROCHLORIDE 50 MG/ML
12.5 INJECTION INTRAMUSCULAR; INTRAVENOUS EVERY 6 HOURS PRN
Status: DISCONTINUED | OUTPATIENT
Start: 2023-09-24 | End: 2023-09-25

## 2023-09-24 RX ORDER — DEXAMETHASONE SODIUM PHOSPHATE 4 MG/ML
INJECTION, SOLUTION INTRA-ARTICULAR; INTRALESIONAL; INTRAMUSCULAR; INTRAVENOUS; SOFT TISSUE PRN
Status: DISCONTINUED | OUTPATIENT
Start: 2023-09-24 | End: 2023-09-24 | Stop reason: SURG

## 2023-09-24 RX ORDER — OXYCODONE HYDROCHLORIDE 5 MG/1
5 TABLET ORAL EVERY 4 HOURS PRN
Status: DISCONTINUED | OUTPATIENT
Start: 2023-09-25 | End: 2023-09-27 | Stop reason: HOSPADM

## 2023-09-24 RX ORDER — SIMETHICONE 125 MG
125 TABLET,CHEWABLE ORAL 4 TIMES DAILY PRN
Status: DISCONTINUED | OUTPATIENT
Start: 2023-09-24 | End: 2023-09-27 | Stop reason: HOSPADM

## 2023-09-24 RX ORDER — METOCLOPRAMIDE HYDROCHLORIDE 5 MG/ML
10 INJECTION INTRAMUSCULAR; INTRAVENOUS ONCE
Status: COMPLETED | OUTPATIENT
Start: 2023-09-24 | End: 2023-09-24

## 2023-09-24 RX ORDER — LABETALOL HYDROCHLORIDE 5 MG/ML
5 INJECTION, SOLUTION INTRAVENOUS
Status: DISCONTINUED | OUTPATIENT
Start: 2023-09-24 | End: 2023-09-24

## 2023-09-24 RX ORDER — OXYCODONE HCL 5 MG/5 ML
5 SOLUTION, ORAL ORAL
Status: DISCONTINUED | OUTPATIENT
Start: 2023-09-24 | End: 2023-09-24

## 2023-09-24 RX ORDER — ACETAMINOPHEN 500 MG
1000 TABLET ORAL EVERY 8 HOURS
Status: COMPLETED | OUTPATIENT
Start: 2023-09-24 | End: 2023-09-25

## 2023-09-24 RX ORDER — ACETAMINOPHEN 500 MG
1000 TABLET ORAL EVERY 6 HOURS
Status: DISCONTINUED | OUTPATIENT
Start: 2023-09-25 | End: 2023-09-27 | Stop reason: HOSPADM

## 2023-09-24 RX ORDER — SODIUM CHLORIDE, SODIUM LACTATE, POTASSIUM CHLORIDE, CALCIUM CHLORIDE 600; 310; 30; 20 MG/100ML; MG/100ML; MG/100ML; MG/100ML
INJECTION, SOLUTION INTRAVENOUS PRN
Status: DISCONTINUED | OUTPATIENT
Start: 2023-09-24 | End: 2023-09-27 | Stop reason: HOSPADM

## 2023-09-24 RX ORDER — OXYCODONE HYDROCHLORIDE 10 MG/1
10 TABLET ORAL EVERY 4 HOURS PRN
Status: DISCONTINUED | OUTPATIENT
Start: 2023-09-25 | End: 2023-09-27 | Stop reason: HOSPADM

## 2023-09-24 RX ORDER — EPHEDRINE SULFATE 50 MG/ML
5 INJECTION, SOLUTION INTRAVENOUS
Status: DISCONTINUED | OUTPATIENT
Start: 2023-09-24 | End: 2023-09-24

## 2023-09-24 RX ORDER — OXYCODONE HCL 5 MG/5 ML
10 SOLUTION, ORAL ORAL
Status: DISCONTINUED | OUTPATIENT
Start: 2023-09-24 | End: 2023-09-24

## 2023-09-24 RX ORDER — METHYLERGONOVINE MALEATE 0.2 MG/ML
0.2 INJECTION INTRAVENOUS
Status: DISCONTINUED | OUTPATIENT
Start: 2023-09-24 | End: 2023-09-27 | Stop reason: HOSPADM

## 2023-09-24 RX ORDER — HYDROMORPHONE HYDROCHLORIDE 1 MG/ML
0.2 INJECTION, SOLUTION INTRAMUSCULAR; INTRAVENOUS; SUBCUTANEOUS
Status: DISCONTINUED | OUTPATIENT
Start: 2023-09-24 | End: 2023-09-24

## 2023-09-24 RX ORDER — KETOROLAC TROMETHAMINE 30 MG/ML
30 INJECTION, SOLUTION INTRAMUSCULAR; INTRAVENOUS EVERY 6 HOURS
Status: COMPLETED | OUTPATIENT
Start: 2023-09-24 | End: 2023-09-25

## 2023-09-24 RX ORDER — OXYTOCIN 10 [USP'U]/ML
INJECTION, SOLUTION INTRAMUSCULAR; INTRAVENOUS PRN
Status: DISCONTINUED | OUTPATIENT
Start: 2023-09-24 | End: 2023-09-24 | Stop reason: SURG

## 2023-09-24 RX ORDER — DOCUSATE SODIUM 100 MG/1
100 CAPSULE, LIQUID FILLED ORAL 2 TIMES DAILY PRN
Status: DISCONTINUED | OUTPATIENT
Start: 2023-09-24 | End: 2023-09-27 | Stop reason: HOSPADM

## 2023-09-24 RX ORDER — OXYCODONE HYDROCHLORIDE 5 MG/1
5 TABLET ORAL EVERY 4 HOURS PRN
Status: DISCONTINUED | OUTPATIENT
Start: 2023-09-24 | End: 2023-09-25

## 2023-09-24 RX ORDER — MEPERIDINE HYDROCHLORIDE 25 MG/ML
6.25 INJECTION INTRAMUSCULAR; INTRAVENOUS; SUBCUTANEOUS
Status: DISCONTINUED | OUTPATIENT
Start: 2023-09-24 | End: 2023-09-24

## 2023-09-24 RX ORDER — SODIUM CHLORIDE, SODIUM GLUCONATE, SODIUM ACETATE, POTASSIUM CHLORIDE AND MAGNESIUM CHLORIDE 526; 502; 368; 37; 30 MG/100ML; MG/100ML; MG/100ML; MG/100ML; MG/100ML
1500 INJECTION, SOLUTION INTRAVENOUS ONCE
Status: COMPLETED | OUTPATIENT
Start: 2023-09-24 | End: 2023-09-24

## 2023-09-24 RX ORDER — IBUPROFEN 800 MG/1
800 TABLET ORAL EVERY 8 HOURS PRN
Status: DISCONTINUED | OUTPATIENT
Start: 2023-09-28 | End: 2023-09-26

## 2023-09-24 RX ORDER — IBUPROFEN 800 MG/1
800 TABLET ORAL EVERY 8 HOURS
Status: DISCONTINUED | OUTPATIENT
Start: 2023-09-25 | End: 2023-09-26

## 2023-09-24 RX ORDER — MORPHINE SULFATE 0.5 MG/ML
INJECTION, SOLUTION EPIDURAL; INTRATHECAL; INTRAVENOUS PRN
Status: DISCONTINUED | OUTPATIENT
Start: 2023-09-24 | End: 2023-09-24 | Stop reason: SURG

## 2023-09-24 RX ORDER — HYDROMORPHONE HYDROCHLORIDE 1 MG/ML
0.1 INJECTION, SOLUTION INTRAMUSCULAR; INTRAVENOUS; SUBCUTANEOUS
Status: DISCONTINUED | OUTPATIENT
Start: 2023-09-24 | End: 2023-09-24

## 2023-09-24 RX ORDER — ONDANSETRON 2 MG/ML
INJECTION INTRAMUSCULAR; INTRAVENOUS PRN
Status: DISCONTINUED | OUTPATIENT
Start: 2023-09-24 | End: 2023-09-24 | Stop reason: SURG

## 2023-09-24 RX ORDER — SODIUM CHLORIDE, SODIUM LACTATE, POTASSIUM CHLORIDE, CALCIUM CHLORIDE 600; 310; 30; 20 MG/100ML; MG/100ML; MG/100ML; MG/100ML
INJECTION, SOLUTION INTRAVENOUS ONCE
Status: ACTIVE | OUTPATIENT
Start: 2023-09-24 | End: 2023-09-25

## 2023-09-24 RX ORDER — OXYTOCIN 10 [USP'U]/ML
10 INJECTION, SOLUTION INTRAMUSCULAR; INTRAVENOUS
Status: DISCONTINUED | OUTPATIENT
Start: 2023-09-24 | End: 2023-09-27 | Stop reason: HOSPADM

## 2023-09-24 RX ORDER — CITRIC ACID/SODIUM CITRATE 334-500MG
30 SOLUTION, ORAL ORAL ONCE
Status: COMPLETED | OUTPATIENT
Start: 2023-09-24 | End: 2023-09-24

## 2023-09-24 RX ORDER — SODIUM CHLORIDE, SODIUM LACTATE, POTASSIUM CHLORIDE, CALCIUM CHLORIDE 600; 310; 30; 20 MG/100ML; MG/100ML; MG/100ML; MG/100ML
INJECTION, SOLUTION INTRAVENOUS CONTINUOUS
Status: DISCONTINUED | OUTPATIENT
Start: 2023-09-24 | End: 2023-09-27 | Stop reason: HOSPADM

## 2023-09-24 RX ORDER — BISACODYL 10 MG
10 SUPPOSITORY, RECTAL RECTAL PRN
Status: DISCONTINUED | OUTPATIENT
Start: 2023-09-24 | End: 2023-09-27 | Stop reason: HOSPADM

## 2023-09-24 RX ORDER — VITAMIN A ACETATE, BETA CAROTENE, ASCORBIC ACID, CHOLECALCIFEROL, .ALPHA.-TOCOPHEROL ACETATE, DL-, THIAMINE MONONITRATE, RIBOFLAVIN, NIACINAMIDE, PYRIDOXINE HYDROCHLORIDE, FOLIC ACID, CYANOCOBALAMIN, CALCIUM CARBONATE, FERROUS FUMARATE, ZINC OXIDE, CUPRIC OXIDE 3080; 12; 120; 400; 1; 1.84; 3; 20; 22; 920; 25; 200; 27; 10; 2 [IU]/1; UG/1; MG/1; [IU]/1; MG/1; MG/1; MG/1; MG/1; MG/1; [IU]/1; MG/1; MG/1; MG/1; MG/1; MG/1
1 TABLET, FILM COATED ORAL
Status: DISCONTINUED | OUTPATIENT
Start: 2023-09-25 | End: 2023-09-27 | Stop reason: HOSPADM

## 2023-09-24 RX ORDER — CALCIUM CARBONATE 500 MG/1
1000 TABLET, CHEWABLE ORAL EVERY 6 HOURS PRN
Status: DISCONTINUED | OUTPATIENT
Start: 2023-09-24 | End: 2023-09-27 | Stop reason: HOSPADM

## 2023-09-24 RX ADMIN — SODIUM CHLORIDE 0.25 MCG/KG/MIN: 900 INJECTION INTRAVENOUS at 12:26

## 2023-09-24 RX ADMIN — KETOROLAC TROMETHAMINE 30 MG: 30 INJECTION, SOLUTION INTRAMUSCULAR; INTRAVENOUS at 18:31

## 2023-09-24 RX ADMIN — METOCLOPRAMIDE 10 MG: 5 INJECTION, SOLUTION INTRAMUSCULAR; INTRAVENOUS at 11:56

## 2023-09-24 RX ADMIN — SODIUM CHLORIDE, SODIUM GLUCONATE, SODIUM ACETATE, POTASSIUM CHLORIDE AND MAGNESIUM CHLORIDE: 526; 502; 368; 37; 30 INJECTION, SOLUTION INTRAVENOUS at 12:15

## 2023-09-24 RX ADMIN — DEXAMETHASONE SODIUM PHOSPHATE 8 MG: 4 INJECTION INTRA-ARTICULAR; INTRALESIONAL; INTRAMUSCULAR; INTRAVENOUS; SOFT TISSUE at 12:26

## 2023-09-24 RX ADMIN — CEFAZOLIN 2 G: 1 INJECTION, POWDER, FOR SOLUTION INTRAMUSCULAR; INTRAVENOUS at 12:26

## 2023-09-24 RX ADMIN — ACETAMINOPHEN 1000 MG: 500 TABLET, FILM COATED ORAL at 23:46

## 2023-09-24 RX ADMIN — SODIUM CHLORIDE, SODIUM GLUCONATE, SODIUM ACETATE, POTASSIUM CHLORIDE AND MAGNESIUM CHLORIDE 1500 ML: 526; 502; 368; 37; 30 INJECTION, SOLUTION INTRAVENOUS at 11:19

## 2023-09-24 RX ADMIN — KETOROLAC TROMETHAMINE 30 MG: 30 INJECTION, SOLUTION INTRAMUSCULAR; INTRAVENOUS at 23:46

## 2023-09-24 RX ADMIN — MORPHINE SULFATE 100 MCG: 0.5 INJECTION, SOLUTION EPIDURAL; INTRATHECAL; INTRAVENOUS at 12:25

## 2023-09-24 RX ADMIN — OXYTOCIN 125 ML/HR: 10 INJECTION, SOLUTION INTRAMUSCULAR; INTRAVENOUS at 13:20

## 2023-09-24 RX ADMIN — ONDANSETRON 4 MG: 2 INJECTION INTRAMUSCULAR; INTRAVENOUS at 12:26

## 2023-09-24 RX ADMIN — SODIUM CITRATE AND CITRIC ACID MONOHYDRATE 30 ML: 500; 334 SOLUTION ORAL at 11:56

## 2023-09-24 RX ADMIN — FENTANYL CITRATE 10 MCG: 50 INJECTION, SOLUTION INTRAMUSCULAR; INTRAVENOUS at 12:25

## 2023-09-24 RX ADMIN — OXYTOCIN 20 UNITS: 10 INJECTION, SOLUTION INTRAMUSCULAR; INTRAVENOUS at 12:42

## 2023-09-24 RX ADMIN — DIPHENHYDRAMINE HYDROCHLORIDE 12.5 MG: 50 INJECTION, SOLUTION INTRAMUSCULAR; INTRAVENOUS at 17:00

## 2023-09-24 RX ADMIN — FAMOTIDINE 20 MG: 10 INJECTION, SOLUTION INTRAVENOUS at 11:57

## 2023-09-24 RX ADMIN — BUPIVACAINE HYDROCHLORIDE IN DEXTROSE 1.6 ML: 7.5 INJECTION, SOLUTION SUBARACHNOID at 12:25

## 2023-09-24 RX ADMIN — DOCUSATE SODIUM 100 MG: 100 CAPSULE, LIQUID FILLED ORAL at 16:23

## 2023-09-24 RX ADMIN — ACETAMINOPHEN 1000 MG: 500 TABLET, FILM COATED ORAL at 16:23

## 2023-09-24 RX ADMIN — KETOROLAC TROMETHAMINE 30 MG: 30 INJECTION, SOLUTION INTRAMUSCULAR; INTRAVENOUS at 13:04

## 2023-09-24 ASSESSMENT — LIFESTYLE VARIABLES: EVER_SMOKED: NEVER

## 2023-09-24 ASSESSMENT — PATIENT HEALTH QUESTIONNAIRE - PHQ9
2. FEELING DOWN, DEPRESSED, IRRITABLE, OR HOPELESS: NOT AT ALL
1. LITTLE INTEREST OR PLEASURE IN DOING THINGS: NOT AT ALL
SUM OF ALL RESPONSES TO PHQ9 QUESTIONS 1 AND 2: 0

## 2023-09-24 ASSESSMENT — FIBROSIS 4 INDEX: FIB4 SCORE: 0.8

## 2023-09-24 ASSESSMENT — PAIN DESCRIPTION - PAIN TYPE
TYPE: SURGICAL PAIN;ACUTE PAIN
TYPE: SURGICAL PAIN;ACUTE PAIN
TYPE: SURGICAL PAIN

## 2023-09-24 NOTE — ANESTHESIA POSTPROCEDURE EVALUATION
Patient: Mar Orr    Procedure Summary     Date: 23 Room / Location: [facility] OR 01 / SURGERY LABOR AND DELIVERY    Anesthesia Start: 1215 Anesthesia Stop: 1310    Procedure: PRIMARY  SECTION Diagnosis: (PERICARDIAL EFFUSION PREGNANCY, 39W)    Surgeons: Kassandra Goldman M.D. Responsible Provider: Guillermo Taveras M.D.    Anesthesia Type: spinal ASA Status: 2          Final Anesthesia Type: spinal  Last vitals  BP   Blood Pressure: 104/67    Temp   36.4 °C (97.6 °F)    Pulse   87   Resp   16    SpO2   96 %      Anesthesia Post Evaluation    Patient location during evaluation: PACU  Patient participation: complete - patient participated  Level of consciousness: awake and alert    Airway patency: patent  Anesthetic complications: no  Cardiovascular status: hemodynamically stable  Respiratory status: acceptable  Hydration status: euvolemic    PONV: none          No notable events documented.     Nurse Pain Score: 0 (NPRS)

## 2023-09-24 NOTE — CARE PLAN
The patient is Stable - Low risk of patient condition declining or worsening    Shift Goals  Clinical Goals: pain control, rest, VSS    Progress made toward(s) clinical / shift goals: Patient VSS and WDL.  and SCDs in place. Pain well controlled at this time with rest and repositioning. Patient on bed rest s/p  surgery.     Patient is not progressing towards the following goals:

## 2023-09-24 NOTE — PROGRESS NOTES
Patient transferred from labor and delivery on [facility] with infant in arms and FOB accompanying with belongings. Two RN verification of infant and parent armbands. Report received from PATRIZIA Isabel RN. Patient oriented to unit and POC including EPDS, BC, IS, feeding frequencies, I&O charting, and medications prn and scheduled. Assessment completed, fundus firm and palpable, lochia scant to light rubra. Patient has a valdez catheter in place with adequate output. Pain management discussed. Will call for PRN pain medication. Pitocin infusing at 125 ml/hr. IV patent, no s/s of infiltration at insertion site. Patient encouraged to call with needs. No further concerns at this time.

## 2023-09-24 NOTE — CARE PLAN
The patient is Stable - Low risk of patient condition declining or worsening    Shift Goals  Clinical Goals: delivery by primary c section  Patient Goals: healthy mom and baby  Family Goals: support    Progress made toward(s) clinical / shift goals:  primary  section, delivered    Patient is not progressing towards the following goals: n/a      Problem: Pain  Goal: Patient's pain will be alleviated or reduced to the patient’s comfort goal  2023 1325 by Maame Hidalgo R.N.  Outcome: Progressing  2023 1141 by Maame Hidalgo R.N.  Outcome: Progressing     Problem: Risk for Infection and Impaired Wound Healing  Goal: Patient will remain free from infection  2023 1325 by Maame Hidalgo R.N.  Outcome: Progressing  2023 1141 by Maame Hidalgo R.JUSTINA.  Outcome: Progressing

## 2023-09-24 NOTE — OP REPORT
DATE OF SERVICE:  2023     PREOPERATIVE DIAGNOSES:  1.  Term intrauterine pregnancy at 39 weeks.  2.  Maternal small pericardial effusion.  3.  The patient desires primary  section.     POSTOPERATIVE DIAGNOSES:   1.  Term intrauterine pregnancy at 39 weeks.  2.  Maternal small pericardial effusion.  3.  The patient desires primary  section.     PROCEDURE PERFORMED:  Primary low transverse  section.     SURGEON:  Kassandra Goldman MD     ASSISTANT:  Kassandra Oconnor MD     ANESTHESIA:  Spinal.     ANESTHESIOLOGIST:  Guillermo Taveras MD     ESTIMATED BLOOD LOSS:  500 mL     URINE OUTPUT:  100 mL of clear urine at the end of procedure.     FINDINGS:  A viable female infant, Apgars of 8 and 9, weight of 7 pounds 0.9   ounces with normal uterus, tubes and ovaries bilaterally.     DESCRIPTION OF PROCEDURE:  The patient was taken to the operating room where   spinal anesthesia was applied without any complications.  The patient was then   prepped and draped in the usual sterile manner.  A formal time-out was called   and IV antibiotics were given.  Pfannenstiel incision was made with a knife   down to the rectus fascia.  The rectus fascia was  from the   underlying rectus muscle first superiorly, then inferiorly.  The rectus muscle   was  sharply in the midline.  The peritoneum was tented up and   entered with Metzenbaum scissors and incision was extended with care to avoid   injury to underlying bowel or bladder.  The vesicouterine peritoneum was   examined and noted to be intact.  A 4 cm incision was made transversely in the   lower uterine segment with a knife and the incision was extended with care to   avoid injury to underlying infant or to the bladder.  The head was then   guided towards the hysterotomy incision and delivered.  The mouth and air   suctioned.  Remainder of infant delivered without complications.  After a   30-second delay, the cord was doubly clamped and cut and  the infant was handed   off to awaiting neonatology team.  The placenta was then manually extracted.    Fragments of membranes were removed.  The hysterotomy incision was   approximated with 0 chromic in a running locking stitch x1.  Hemostasis was   noted.  The pericolic gutters were examined, blood clots were removed.  The   muscle and peritoneum was approximated with three mattress sutures of 0   chromic.  The fascia was approximated with #1 Vicryl, the subcutaneous fat was   irrigated, small bleeders were bovied.  The subcutaneous fat was approximated   with 3 interrupted sutures of #1 Vicryl.  The skin was approximated with 4-0   Monocryl subcuticular stitch.  Prineo mesh was placed over the incision,   followed by Dermabond glue and Tegaderm was used for dressing.  Sponge,   needle, instrument and lap counts were correct x2.  The patient tolerated the   procedure well and went to recovery room in stable condition.        ______________________________  MD CHAYO Castañeda/BAKARI/    DD:  09/24/2023 14:34  DT:  09/24/2023 15:28    Job#:  [id number]

## 2023-09-24 NOTE — ANESTHESIA PROCEDURE NOTES
Spinal Block    Date/Time: 9/24/2023 12:21 PM    Performed by: Guillermo Taveras M.D.  Authorized by: Guillermo Taveras M.D.    Patient Location:  OR  Start Time:  9/24/2023 12:21 PM  End Time:  9/24/2023 12:25 PM  Reason for Block: primary anesthetic    patient identified, IV checked, site marked, risks and benefits discussed, surgical consent, monitors and equipment checked, pre-op evaluation and timeout performed    Patient Position:  Sitting  Prep: ChloraPrep, patient draped and sterile technique    Monitoring:  Blood pressure, continuous pulse oximetry and heart rate  Approach:  Midline  Location:  L3-4  Injection Technique:  Single-shot  Skin infiltration:  Lidocaine  Strength:  1%  Dose:  3ml  Needle Type:  Pencan  Needle Gauge:  25 G  CSF flowing pre/post injection:  Yes  Sensory Level:  T6   Easy x 1 attempt

## 2023-09-24 NOTE — H&P
History and Physical      Mar Orr is a 28 y.o. female  at 39w0d who presents for primary C/S for mild pericardial effusion and symptomatic with chest pain and SOB off and on. Pt desires C/S for delivery. No CTXs, LOF or VB.    ROS: A comprehensive review of systems was negative.    Past Medical History:   Diagnosis Date    Asthma     Chronic back pain     Obesity     Pericardial effusion 2023    PONV (postoperative nausea and vomiting)      Past Surgical History:   Procedure Laterality Date    OTHER N/A 2021    gastric sleeve     Family History   Problem Relation Age of Onset    Arthritis Mother     No Known Problems Father     Lung Disease Brother         asthma     Diabetes Maternal Grandfather     Cancer Maternal Grandfather         prostate ca      OB History    Para Term  AB Living   2 0 0 0 0 0   SAB IAB Ectopic Molar Multiple Live Births   0 0 0 0 0 0      # Outcome Date GA Lbr Marquez/2nd Weight Sex Delivery Anes PTL Lv   2 Current            1               Social History     Tobacco Use    Smoking status: Never    Smokeless tobacco: Never   Vaping Use    Vaping Use: Former   Substance Use Topics    Alcohol use: Not Currently    Drug use: Not Currently     Allergies: Patient has no known allergies.    Current Facility-Administered Medications:     lactated ringers (LR) infusion, , Intravenous, Continuous, Kassandra Goldman M.D., Dose not Required at 23 1100    ceFAZolin (Ancef) injection 2 g, 2 g, Intravenous, Once, Kassandra Goldman M.D.    oxytocin (Pitocin) infusion bolus (for post delivery), 20 Units, Intravenous, Once **FOLLOWED BY** oxytocin (Pitocin) infusion (for post delivery), 125 mL/hr, Intravenous, Continuous, Kassandra Goldman M.D.    Prenatal care with Goldman starting at 1st trimester with following problems:  Patient Active Problem List    Diagnosis Date Noted    Indication for care in labor or delivery 2023    Asthma     Pain in the chest  "08/28/2023    Pericardial effusion 08/28/2023    Chest pain, atypical 08/28/2023    35 weeks gestation of pregnancy 08/28/2023    Patellar dislocation 04/23/2021    Establishing care with new doctor, encounter for 03/23/2021    Health care maintenance 03/23/2021    Mild intermittent asthma without complication 03/23/2021    Morbid obesity (HCC) 03/23/2021     Objective:      /67   Pulse 87   Temp 36.4 °C (97.6 °F) (Temporal)   Resp 16   Ht 1.676 m (5' 6\")   Wt 87.5 kg (193 lb)   SpO2 96%     General:   no acute distress, alert, cooperative   Skin:   normal   HEENT:  Neck supple with midline trachea   Lungs:   CTA bilateral   Heart:   regular rate and rhythm   Abdomen:   gravid, NT   EFW:  7 lbs   Pelvis:  adequate with gynecoid pelvis   FHT:  Reactive NST   Uterine Size: S=D   Presentations: Cephalic     Lab Review  Recent Results (from the past 24 hour(s))   Hold Blood Bank Specimen (Not Tested)    Collection Time: 09/24/23 10:37 AM   Result Value Ref Range    Holding Tube - Bb DONE    CBC with Differential    Collection Time: 09/24/23 10:37 AM   Result Value Ref Range    WBC 8.4 4.8 - 10.8 K/uL    RBC 4.45 4.20 - 5.40 M/uL    Hemoglobin 13.2 12.0 - 16.0 g/dL    Hematocrit 38.9 37.0 - 47.0 %    MCV 87.4 81.4 - 97.8 fL    MCH 29.7 27.0 - 33.0 pg    MCHC 33.9 32.2 - 35.5 g/dL    RDW 39.6 35.9 - 50.0 fL    Platelet Count 198 164 - 446 K/uL    MPV 11.2 9.0 - 12.9 fL    Neutrophils-Polys 67.40 44.00 - 72.00 %    Lymphocytes 25.50 22.00 - 41.00 %    Monocytes 6.00 0.00 - 13.40 %    Eosinophils 0.40 0.00 - 6.90 %    Basophils 0.20 0.00 - 1.80 %    Immature Granulocytes 0.50 0.00 - 0.90 %    Nucleated RBC 0.00 0.00 - 0.20 /100 WBC    Neutrophils (Absolute) 5.67 1.82 - 7.42 K/uL    Lymphs (Absolute) 2.14 1.00 - 4.80 K/uL    Monos (Absolute) 0.50 0.00 - 0.85 K/uL    Eos (Absolute) 0.03 0.00 - 0.51 K/uL    Baso (Absolute) 0.02 0.00 - 0.12 K/uL    Immature Granulocytes (abs) 0.04 0.00 - 0.11 K/uL    NRBC (Absolute) " 0.00 K/uL   T.PALLIDUM AB MEIR (Syphilis)    Collection Time: 23 10:37 AM   Result Value Ref Range    Syphilis, Treponemal Qual Non-Reactive Non-Reactive         Assessment:   Mar Orr at 39w0d  Pericardial Effusion   Plan:   Admit to L&D    Discussed with the patient indications for  delivery. The patient voiced understanding of indications for  section at this time.    Discussed with the patient the risks of  delivery. The risks include infection, bleeding, scarring, damage to other organs in the area of operation. Specifically organs that can be damaged are bowel, bladder, ureters. I also discussed with the patient the risk of wound infection and wound breakdown. We discussed that these risks are greater in people that have diabetes or obesity. I also discussed the risk of emergency blood transfusion during procedure as well as emergency hysterectomy during procedure.    Patient had the opportunity to ask questions regarding procedures. All questions answered to the patient's satisfaction.    Proceed with  delivery

## 2023-09-24 NOTE — PROGRESS NOTES
Patient presents to Labor & Delivery at 39w0d, EDC 10/1 for primary  section. Hx pericardial effusion. Written consent obtained for procedure and anesthesia.    1241 delivery of viable female infant by primary  section. Patient transferred to PACU in stable condition. Postpartum checks WNL. Normal sinus rhythm noted in PACU.    1446 Patient transferred to Postpartum in stable condition. Report given to ROSY Blackburn.

## 2023-09-24 NOTE — ANESTHESIA TIME REPORT
Anesthesia Start and Stop Event Times     Date Time Event    9/24/2023 1145 Ready for Procedure     1215 Anesthesia Start     1310 Anesthesia Stop        Responsible Staff  09/24/23    Name Role Begin End    Guillermo Taveras M.D. Anesth 1215 1310        Overtime Reason:  no overtime (within assigned shift)    Comments:

## 2023-09-24 NOTE — ANESTHESIA PREPROCEDURE EVALUATION
Case: [id number] Date/Time: 23 1145    Procedure: PRIMARY  SECTION    Pre-op diagnosis: PERICARDIAL EFFUSION PREGNANCY, 39W    Location: [facility] OR 01 / SURGERY LABOR AND DELIVERY    Surgeons: Kassandra Goldman M.D.          Relevant Problems   ANESTHESIA (within normal limits)      PULMONARY   (positive) Asthma   (positive) Mild intermittent asthma without complication      NEURO (within normal limits)      CARDIAC (within normal limits)      GI (within normal limits)       (within normal limits)      ENDO (within normal limits)      OB (within normal limits)      Other   (positive) Chest pain, atypical   (positive) Morbid obesity (HCC)   (positive) Pericardial effusion       Physical Exam    Airway   Mallampati: II  TM distance: >3 FB  Neck ROM: full       Cardiovascular - normal exam  Rhythm: regular  Rate: normal  (-) murmur     Dental - normal exam           Pulmonary - normal exam  Breath sounds clear to auscultation     Abdominal    Neurological - normal exam                 Anesthesia Plan    ASA 2       Plan - spinal   Neuraxial block will be primary anesthetic                Postoperative Plan: Postoperative administration of opioids is intended.    Pertinent diagnostic labs and testing reviewed    Informed Consent:    Anesthetic plan and risks discussed with patient.

## 2023-09-25 LAB
ERYTHROCYTE [DISTWIDTH] IN BLOOD BY AUTOMATED COUNT: 39.3 FL (ref 35.9–50)
HCT VFR BLD AUTO: 31.4 % (ref 37–47)
HGB BLD-MCNC: 11 G/DL (ref 12–16)
MCH RBC QN AUTO: 30.8 PG (ref 27–33)
MCHC RBC AUTO-ENTMCNC: 35 G/DL (ref 32.2–35.5)
MCV RBC AUTO: 88 FL (ref 81.4–97.8)
PLATELET # BLD AUTO: 168 K/UL (ref 164–446)
PMV BLD AUTO: 11.3 FL (ref 9–12.9)
RBC # BLD AUTO: 3.57 M/UL (ref 4.2–5.4)
WBC # BLD AUTO: 13.2 K/UL (ref 4.8–10.8)

## 2023-09-25 PROCEDURE — 85027 COMPLETE CBC AUTOMATED: CPT

## 2023-09-25 PROCEDURE — A9270 NON-COVERED ITEM OR SERVICE: HCPCS | Performed by: OBSTETRICS & GYNECOLOGY

## 2023-09-25 PROCEDURE — 36415 COLL VENOUS BLD VENIPUNCTURE: CPT

## 2023-09-25 PROCEDURE — 700102 HCHG RX REV CODE 250 W/ 637 OVERRIDE(OP): Performed by: OBSTETRICS & GYNECOLOGY

## 2023-09-25 PROCEDURE — 700111 HCHG RX REV CODE 636 W/ 250 OVERRIDE (IP): Mod: JZ | Performed by: ANESTHESIOLOGY

## 2023-09-25 PROCEDURE — 700102 HCHG RX REV CODE 250 W/ 637 OVERRIDE(OP): Performed by: ANESTHESIOLOGY

## 2023-09-25 PROCEDURE — 770002 HCHG ROOM/CARE - OB PRIVATE (112)

## 2023-09-25 PROCEDURE — A9270 NON-COVERED ITEM OR SERVICE: HCPCS | Performed by: ANESTHESIOLOGY

## 2023-09-25 RX ADMIN — PRENATAL WITH FERROUS FUM AND FOLIC ACID 1 TABLET: 3080; 920; 120; 400; 22; 1.84; 3; 20; 10; 1; 12; 200; 27; 25; 2 TABLET ORAL at 07:18

## 2023-09-25 RX ADMIN — ACETAMINOPHEN 1000 MG: 500 TABLET, FILM COATED ORAL at 23:04

## 2023-09-25 RX ADMIN — ACETAMINOPHEN 1000 MG: 500 TABLET, FILM COATED ORAL at 07:18

## 2023-09-25 RX ADMIN — ACETAMINOPHEN 1000 MG: 500 TABLET, FILM COATED ORAL at 16:43

## 2023-09-25 RX ADMIN — KETOROLAC TROMETHAMINE 30 MG: 30 INJECTION, SOLUTION INTRAMUSCULAR; INTRAVENOUS at 12:27

## 2023-09-25 RX ADMIN — KETOROLAC TROMETHAMINE 30 MG: 30 INJECTION, SOLUTION INTRAMUSCULAR; INTRAVENOUS at 06:08

## 2023-09-25 ASSESSMENT — EDINBURGH POSTNATAL DEPRESSION SCALE (EPDS)
THE THOUGHT OF HARMING MYSELF HAS OCCURRED TO ME: NEVER
I HAVE BEEN ABLE TO LAUGH AND SEE THE FUNNY SIDE OF THINGS: AS MUCH AS I ALWAYS COULD
I HAVE LOOKED FORWARD WITH ENJOYMENT TO THINGS: RATHER LESS THAN I USED TO
THINGS HAVE BEEN GETTING ON TOP OF ME: NO, MOST OF THE TIME I HAVE COPED QUITE WELL
I HAVE BEEN SO UNHAPPY THAT I HAVE BEEN CRYING: NO, NEVER
I HAVE BLAMED MYSELF UNNECESSARILY WHEN THINGS WENT WRONG: NOT VERY OFTEN
I HAVE FELT SCARED OR PANICKY FOR NO GOOD REASON: NO, NOT MUCH
I HAVE BEEN ANXIOUS OR WORRIED FOR NO GOOD REASON: YES, SOMETIMES
I HAVE FELT SAD OR MISERABLE: NOT VERY OFTEN
I HAVE BEEN SO UNHAPPY THAT I HAVE HAD DIFFICULTY SLEEPING: NOT AT ALL

## 2023-09-25 ASSESSMENT — PAIN DESCRIPTION - PAIN TYPE: TYPE: SURGICAL PAIN

## 2023-09-25 NOTE — CARE PLAN
The patient is Stable - Low risk of patient condition declining or worsening    Shift Goals  Clinical Goals: rest, pain control, VSS  Patient Goals: healthy mom and baby  Family Goals: support    Progress made toward(s) clinical / shift goals:  patient able to ambulate with assistance  Problem: Pain  Goal: Patient's pain will be alleviated or reduced to the patient’s comfort goal  Outcome: Progressing  Note: Pain level within patient comfort goal. Medicated as scheduled.      Problem: Altered Physiologic Condition  Goal: Patient physiologically stable as evidenced by normal lochia, palpable uterine involution and vitals within normal limits  Outcome: Progressing  Note: Fundus firm at U, lochia rubra minimal. Surgical incision with dermabond CDI. VSS   , no dizziness noted.    Patient is not progressing towards the following goals:

## 2023-09-25 NOTE — PROGRESS NOTES
Bedside report done, patient in bed socializing with visitors. Denies pain at this time. Plan of care on going.

## 2023-09-25 NOTE — CARE PLAN
The patient is Stable - Low risk of patient condition declining or worsening    Shift Goals  Clinical Goals: Pain control  Patient Goals: healthy mom and baby  Family Goals: support    Progress made toward(s) clinical / shift goals:  Pain well controlled    Patient is not progressing towards the following goals:

## 2023-09-26 PROCEDURE — A9270 NON-COVERED ITEM OR SERVICE: HCPCS | Performed by: OBSTETRICS & GYNECOLOGY

## 2023-09-26 PROCEDURE — 700102 HCHG RX REV CODE 250 W/ 637 OVERRIDE(OP): Performed by: OBSTETRICS & GYNECOLOGY

## 2023-09-26 PROCEDURE — 700111 HCHG RX REV CODE 636 W/ 250 OVERRIDE (IP): Performed by: OBSTETRICS & GYNECOLOGY

## 2023-09-26 PROCEDURE — 770002 HCHG ROOM/CARE - OB PRIVATE (112)

## 2023-09-26 RX ADMIN — DOCUSATE SODIUM 100 MG: 100 CAPSULE, LIQUID FILLED ORAL at 09:03

## 2023-09-26 RX ADMIN — OXYCODONE HYDROCHLORIDE 10 MG: 10 TABLET ORAL at 09:25

## 2023-09-26 RX ADMIN — ACETAMINOPHEN 1000 MG: 500 TABLET, FILM COATED ORAL at 04:56

## 2023-09-26 RX ADMIN — ACETAMINOPHEN 1000 MG: 500 TABLET, FILM COATED ORAL at 17:16

## 2023-09-26 RX ADMIN — ACETAMINOPHEN 1000 MG: 500 TABLET, FILM COATED ORAL at 10:55

## 2023-09-26 RX ADMIN — PRENATAL WITH FERROUS FUM AND FOLIC ACID 1 TABLET: 3080; 920; 120; 400; 22; 1.84; 3; 20; 10; 1; 12; 200; 27; 25; 2 TABLET ORAL at 09:03

## 2023-09-26 RX ADMIN — ONDANSETRON 4 MG: 4 TABLET, ORALLY DISINTEGRATING ORAL at 10:55

## 2023-09-26 ASSESSMENT — PAIN DESCRIPTION - PAIN TYPE
TYPE: SURGICAL PAIN
TYPE: ACUTE PAIN;SURGICAL PAIN
TYPE: SURGICAL PAIN

## 2023-09-26 NOTE — CARE PLAN
The patient is Stable - Low risk of patient condition declining or worsening    Shift Goals  Clinical Goals: VSS,light lochia,incision dressing clean,dry and intact  Patient Goals: rest  Family Goals: rest,breast feed    Progress made toward(s) clinical / shift goals:  progressing    Patient is not progressing towards the following goals:

## 2023-09-26 NOTE — PROGRESS NOTES
0900 Assessment completed. Lochia light, fundus firm. Plan of care reviewed. Reports Moderate pain, see MAR for intervention, will call if pain intervention needed.

## 2023-09-26 NOTE — PROGRESS NOTES
"Mar Onealistinbala Orr PP day 1 POD 1    Subjective: Abdominal pain. minimal, ambulating .yes, tolerating liquids .yes, tolerating regular diet .yes, flatus.yes, BM .yes, Bleeding .light, voiding .yes,dizziness .no, breast feeding.yes, breast tenderness .no    /62   Pulse 79   Temp 36.6 °C (97.9 °F) (Temporal)   Resp 18   Ht 1.676 m (5' 6\")   Wt 87.5 kg (193 lb)   SpO2 96%   Breast Exam: Tenderness .no, Engourgement .no, Mastitis .no  Abdomen soft, non-tender. BS normal. No masses,  No organomegaly  Incision: dry and intact  Fundus Tenderness:no, Below umbilicus:Yes, firm  Perineumperineum intact  Extremitiesno cyanosis, clubbing    Meds:   No current facility-administered medications on file prior to encounter.     No current outpatient medications on file prior to encounter.       Lab:   Recent Results (from the past 48 hour(s))   Hold Blood Bank Specimen (Not Tested)    Collection Time: 09/24/23 10:37 AM   Result Value Ref Range    Holding Tube - Bb DONE    CBC with Differential    Collection Time: 09/24/23 10:37 AM   Result Value Ref Range    WBC 8.4 4.8 - 10.8 K/uL    RBC 4.45 4.20 - 5.40 M/uL    Hemoglobin 13.2 12.0 - 16.0 g/dL    Hematocrit 38.9 37.0 - 47.0 %    MCV 87.4 81.4 - 97.8 fL    MCH 29.7 27.0 - 33.0 pg    MCHC 33.9 32.2 - 35.5 g/dL    RDW 39.6 35.9 - 50.0 fL    Platelet Count 198 164 - 446 K/uL    MPV 11.2 9.0 - 12.9 fL    Neutrophils-Polys 67.40 44.00 - 72.00 %    Lymphocytes 25.50 22.00 - 41.00 %    Monocytes 6.00 0.00 - 13.40 %    Eosinophils 0.40 0.00 - 6.90 %    Basophils 0.20 0.00 - 1.80 %    Immature Granulocytes 0.50 0.00 - 0.90 %    Nucleated RBC 0.00 0.00 - 0.20 /100 WBC    Neutrophils (Absolute) 5.67 1.82 - 7.42 K/uL    Lymphs (Absolute) 2.14 1.00 - 4.80 K/uL    Monos (Absolute) 0.50 0.00 - 0.85 K/uL    Eos (Absolute) 0.03 0.00 - 0.51 K/uL    Baso (Absolute) 0.02 0.00 - 0.12 K/uL    Immature Granulocytes (abs) 0.04 0.00 - 0.11 K/uL    NRBC (Absolute) 0.00 K/uL "   T.PALLIDUM AB MEIR (Syphilis)    Collection Time: 09/24/23 10:37 AM   Result Value Ref Range    Syphilis, Treponemal Qual Non-Reactive Non-Reactive   CBC without differential- Once in AM regardless of delivery time    Collection Time: 09/25/23  6:02 AM   Result Value Ref Range    WBC 13.2 (H) 4.8 - 10.8 K/uL    RBC 3.57 (L) 4.20 - 5.40 M/uL    Hemoglobin 11.0 (L) 12.0 - 16.0 g/dL    Hematocrit 31.4 (L) 37.0 - 47.0 %    MCV 88.0 81.4 - 97.8 fL    MCH 30.8 27.0 - 33.0 pg    MCHC 35.0 32.2 - 35.5 g/dL    RDW 39.3 35.9 - 50.0 fL    Platelet Count 168 164 - 446 K/uL    MPV 11.3 9.0 - 12.9 fL       Assessment and Plan  normal postpartum course  No heavy bleeding or foul vaginal discharge     Continue Routine postpartum care  Ambulate

## 2023-09-26 NOTE — LACTATION NOTE
This note was copied from a baby's chart.  Initial Lactation Consultation:    Met with Cathy and her new baby girl to provide breastfeeding support.  She reports that infant has been both sleepy and fussy throughout the day, though she has been able to successfully latch on several occasions. She does report nipple tenderness and breast evaluation shows bilateral compression stripes, with small blisters to the tips of her nipples.    Infant presents with feeding cues at this time; she is placed skin-to-skin with her mother. Lactation consultant assisted with latch onto right breast, using clutch hold.  She is initially fussy, latching and unlatching frequently. After 5 minutes of fussing with her feeding, sustained latch achieved and rhythmic suckling noted.     [place] feeding and voiding/stooling patterns reviewed. Frequent skin-to-skin and cue-based feeding is encouraged; at least 8 feeds per 24 hours. Reviewed the milk making process, inclusive of supply and demand. Discussed signs of deep, asymmetric latch, and the importance of maintaining good latch to avoid pain/nipple damage and maximize milk transfer. Cathy is to offer both breasts at each feeding, and baby should be allowed to self-limit time at breast. Sore nipple care reviewed.    Feeding plan:     Continue with cue-based breast feeding, at least once every three hours, for a total of 8+ feedings per 24 hours. Focus on achieving a deep latch with each feed.    Cathy is provided with the opportunity to ask questions. These have been answered to her satisfaction. She is encouraged to call RN/lactation for additional breastfeeding assistance, as needed, throughout remainder of hospital stay.       [facility] Breastfeeding Resource list provided.

## 2023-09-26 NOTE — LACTATION NOTE
Follow up lactation visit:    Met with Mar and her baby girl to provide follow up breastfeeding support. Baby has been very fussy and frantic at the breast. Infant will latch, suckle for one or two sucks, then come off the breast crying.     Mar reports that baby has not seemed content after any of her feeds at the breast, despite hand expressing colostrum in addition to latching.     Mar is requesting supplementation with donor breast milk. We reviewed that supplementation is indicated when infant's are unable to sustain an optimal latch. We reviewed that additional breast stimulation is also indicated when baby is not feeding optimally. Mar is interested in a trial of donor milk, and manual pumping.     Feeding Plan:    Continue with cue-based breastfeeding, at least once every 3 hours, for a total of 8+ feeds per 24 hours. If sub-optimal latching is occurring due to infant's frantic crying at breast, offer supplementation during breast feed: Begin with drops of DBM applied to the breast to encourage sustained latch. If infant still unable to achieve optimal latch, offer supplementation by method of paced bottle feeding. Supplemental feeding guidelines reviewed and copy provided to patient.    Mar is encouraged to call RN/LC with any developing breastfeeding related questions or concerns.    Follow up with [facility] for outpatient lactation care, as needed.

## 2023-09-26 NOTE — PROGRESS NOTES
"Mar Jeanette Pettygilberto Orr PP day 2 POD 2    Subjective: Abdominal pain. no, ambulating .yes, tolerating liquids .yes, tolerating regular diet .yes, flatus.yes, BM .yes, Bleeding .light, voiding .yes,dizziness .no, breast feeding.yes, breast tenderness .no    /62   Pulse 79   Temp 36.6 °C (97.9 °F) (Temporal)   Resp 18   Ht 1.676 m (5' 6\")   Wt 87.5 kg (193 lb)   SpO2 96%   Breast Exam: Tenderness .no, Engourgement .no, Mastitis .no  Abdomen soft, non-tender. BS normal. No masses,  No organomegaly  Incision: dry and intact  Fundus Tenderness:no, Below umbilicus:Yes, firm  Perineumperineum intact  ExtremitiesNormal    Meds:   No current facility-administered medications on file prior to encounter.     No current outpatient medications on file prior to encounter.       Lab:   Recent Results (from the past 48 hour(s))   Hold Blood Bank Specimen (Not Tested)    Collection Time: 09/24/23 10:37 AM   Result Value Ref Range    Holding Tube - Bb DONE    CBC with Differential    Collection Time: 09/24/23 10:37 AM   Result Value Ref Range    WBC 8.4 4.8 - 10.8 K/uL    RBC 4.45 4.20 - 5.40 M/uL    Hemoglobin 13.2 12.0 - 16.0 g/dL    Hematocrit 38.9 37.0 - 47.0 %    MCV 87.4 81.4 - 97.8 fL    MCH 29.7 27.0 - 33.0 pg    MCHC 33.9 32.2 - 35.5 g/dL    RDW 39.6 35.9 - 50.0 fL    Platelet Count 198 164 - 446 K/uL    MPV 11.2 9.0 - 12.9 fL    Neutrophils-Polys 67.40 44.00 - 72.00 %    Lymphocytes 25.50 22.00 - 41.00 %    Monocytes 6.00 0.00 - 13.40 %    Eosinophils 0.40 0.00 - 6.90 %    Basophils 0.20 0.00 - 1.80 %    Immature Granulocytes 0.50 0.00 - 0.90 %    Nucleated RBC 0.00 0.00 - 0.20 /100 WBC    Neutrophils (Absolute) 5.67 1.82 - 7.42 K/uL    Lymphs (Absolute) 2.14 1.00 - 4.80 K/uL    Monos (Absolute) 0.50 0.00 - 0.85 K/uL    Eos (Absolute) 0.03 0.00 - 0.51 K/uL    Baso (Absolute) 0.02 0.00 - 0.12 K/uL    Immature Granulocytes (abs) 0.04 0.00 - 0.11 K/uL    NRBC (Absolute) 0.00 K/uL   T.PALLIDUM AB MEIR " (Syphilis)    Collection Time: 09/24/23 10:37 AM   Result Value Ref Range    Syphilis, Treponemal Qual Non-Reactive Non-Reactive   CBC without differential- Once in AM regardless of delivery time    Collection Time: 09/25/23  6:02 AM   Result Value Ref Range    WBC 13.2 (H) 4.8 - 10.8 K/uL    RBC 3.57 (L) 4.20 - 5.40 M/uL    Hemoglobin 11.0 (L) 12.0 - 16.0 g/dL    Hematocrit 31.4 (L) 37.0 - 47.0 %    MCV 88.0 81.4 - 97.8 fL    MCH 30.8 27.0 - 33.0 pg    MCHC 35.0 32.2 - 35.5 g/dL    RDW 39.3 35.9 - 50.0 fL    Platelet Count 168 164 - 446 K/uL    MPV 11.3 9.0 - 12.9 fL       Assessment and Plan  normal postpartum course  No heavy bleeding or foul vaginal discharge     Continue Routine postpartum care  Ambulate

## 2023-09-27 ENCOUNTER — PHARMACY VISIT (OUTPATIENT)
Dept: PHARMACY | Facility: MEDICAL CENTER | Age: 28
End: 2023-09-27
Payer: COMMERCIAL

## 2023-09-27 VITALS
RESPIRATION RATE: 18 BRPM | BODY MASS INDEX: 31.02 KG/M2 | TEMPERATURE: 97.9 F | SYSTOLIC BLOOD PRESSURE: 108 MMHG | HEART RATE: 76 BPM | HEIGHT: 66 IN | WEIGHT: 193 LBS | OXYGEN SATURATION: 97 % | DIASTOLIC BLOOD PRESSURE: 65 MMHG

## 2023-09-27 PROCEDURE — 700111 HCHG RX REV CODE 636 W/ 250 OVERRIDE (IP): Mod: JZ | Performed by: OBSTETRICS & GYNECOLOGY

## 2023-09-27 PROCEDURE — A9270 NON-COVERED ITEM OR SERVICE: HCPCS | Performed by: OBSTETRICS & GYNECOLOGY

## 2023-09-27 PROCEDURE — 700102 HCHG RX REV CODE 250 W/ 637 OVERRIDE(OP): Performed by: OBSTETRICS & GYNECOLOGY

## 2023-09-27 PROCEDURE — RXMED WILLOW AMBULATORY MEDICATION CHARGE: Performed by: OBSTETRICS & GYNECOLOGY

## 2023-09-27 RX ORDER — ACETAMINOPHEN 500 MG
1000 TABLET ORAL EVERY 6 HOURS PRN
Qty: 60 TABLET | Refills: 1 | Status: SHIPPED | OUTPATIENT
Start: 2023-09-27

## 2023-09-27 RX ORDER — PSEUDOEPHEDRINE HCL 30 MG
100 TABLET ORAL 2 TIMES DAILY PRN
Qty: 60 CAPSULE | Refills: 1 | Status: SHIPPED | OUTPATIENT
Start: 2023-09-27

## 2023-09-27 RX ORDER — SIMETHICONE 125 MG
125 TABLET,CHEWABLE ORAL 3 TIMES DAILY PRN
Status: DISCONTINUED | OUTPATIENT
Start: 2023-09-27 | End: 2023-09-27 | Stop reason: HOSPADM

## 2023-09-27 RX ORDER — OXYCODONE HYDROCHLORIDE 5 MG/1
5 TABLET ORAL EVERY 4 HOURS PRN
Qty: 30 TABLET | Refills: 0 | Status: SHIPPED | OUTPATIENT
Start: 2023-09-27 | End: 2023-10-04

## 2023-09-27 RX ORDER — KETOROLAC TROMETHAMINE 30 MG/ML
15 INJECTION, SOLUTION INTRAMUSCULAR; INTRAVENOUS EVERY 6 HOURS PRN
Status: DISCONTINUED | OUTPATIENT
Start: 2023-09-27 | End: 2023-09-27 | Stop reason: HOSPADM

## 2023-09-27 RX ADMIN — ACETAMINOPHEN 1000 MG: 500 TABLET, FILM COATED ORAL at 02:59

## 2023-09-27 RX ADMIN — PRENATAL WITH FERROUS FUM AND FOLIC ACID 1 TABLET: 3080; 920; 120; 400; 22; 1.84; 3; 20; 10; 1; 12; 200; 27; 25; 2 TABLET ORAL at 08:55

## 2023-09-27 RX ADMIN — KETOROLAC TROMETHAMINE 15 MG: 30 INJECTION, SOLUTION INTRAMUSCULAR; INTRAVENOUS at 06:08

## 2023-09-27 RX ADMIN — ACETAMINOPHEN 1000 MG: 500 TABLET, FILM COATED ORAL at 08:55

## 2023-09-27 RX ADMIN — DOCUSATE SODIUM 100 MG: 100 CAPSULE, LIQUID FILLED ORAL at 06:21

## 2023-09-27 RX ADMIN — SIMETHICONE 125 MG: 125 TABLET, CHEWABLE ORAL at 08:55

## 2023-09-27 RX ADMIN — OXYCODONE HYDROCHLORIDE 5 MG: 5 TABLET ORAL at 02:59

## 2023-09-27 RX ADMIN — ACETAMINOPHEN 1000 MG: 500 TABLET, FILM COATED ORAL at 14:52

## 2023-09-27 ASSESSMENT — PAIN DESCRIPTION - PAIN TYPE
TYPE: SURGICAL PAIN

## 2023-09-27 NOTE — PROGRESS NOTES
Assessment completed, fundus firm, lochia light. Abdominal incision with dressing intact. Plan of Care reviewed with patient and understanding verbalized. Patient will call if pain medication intervention needed.     0550 Call placed to Dr. Goldman regarding excessive pain, orders received.

## 2023-09-27 NOTE — CARE PLAN
The patient is Stable - Low risk of patient condition declining or worsening    Shift Goals  Clinical Goals: Patient will maintain stable VS: Lochia WDL; Pain WDL  Patient Goals: rest  Family Goals: rest,breast feed    Progress made toward(s) clinical / shift goals:      Problem: Altered Physiologic Condition  Goal: Patient physiologically stable as evidenced by normal lochia, palpable uterine involution and vitals within normal limits  Outcome: Progressing  Note: Fundus firm, lochia light to scant, ambulating.      Problem: Infection - Postpartum  Goal: Postpartum patient will be free of signs and symptoms of infection  Outcome: Progressing  Note: Patient remains free from signs and symptoms of infection, vital signs stable.

## 2023-09-27 NOTE — DISCHARGE SUMMARY
Discharge Summary:      Mar Orr    Admit Date:   2023  Discharge Date:  2023     Admitting diagnosis:  Indication for care in labor or delivery [O75.9]  Discharge Diagnosis: Status post  for maternal pericardial effusion.  Pregnancy Complications: None  Tubal Ligation:  no     History:  Past Medical History:   Diagnosis Date    Asthma     Chronic back pain     Obesity     Pericardial effusion 2023    PONV (postoperative nausea and vomiting)      OB History    Para Term  AB Living   2 1 1 0 0 1   SAB IAB Ectopic Molar Multiple Live Births   0 0 0 0 0 1      # Outcome Date GA Lbr Marquez/2nd Weight Sex Delivery Anes PTL Lv   2 Term 23 39w0d  3.2 kg (7 lb 0.9 oz) F CS-LTranv Spinal N HANNA   1               Patient has no known allergies.     Hospital Course:   28 y.o. , now para 1, was admitted with the above mentioned diagnosis, underwent Primary  pericardial effusion , no complications. Patient postpartum course was unremarkable, with progressive advancement in diet , ambulation and toleration of oral analgesia. Patient without complaints today and desires discharge.      Vitals:    23 1800 23 0600 23 1731 23 0600   BP: 113/62 113/63 105/62 108/65   Pulse: 79 71 79 76   Resp: 18 18 18 18   Temp: 36.6 °C (97.9 °F) 36.7 °C (98 °F) 36.8 °C (98.2 °F) 36.6 °C (97.9 °F)   TempSrc: Temporal Temporal Temporal Temporal   SpO2: 96% 97% 96% 97%   Weight:       Height:         Exam:  Breast Exam: negative  Abdomen: Abdomen soft, non-tender. BS normal. No masses,  No organomegaly  Fundus Non Tender: yes  Incision: dry and intact  Perineum: perineum intact  Extremity: extremities, peripheral pulses and reflexes normal     Labs:  Recent Labs     23  1037 23  0602   WBC 8.4 13.2*   RBC 4.45 3.57*   HEMOGLOBIN 13.2 11.0*   HEMATOCRIT 38.9 31.4*   MCV 87.4 88.0   MCH 29.7 30.8   MCHC 33.9 35.0   RDW 39.6 39.3    PLATELETCT 198 168   MPV 11.2 11.3        Activity:   Discharge to home  Pelvic Rest x 6 weeks    Assessment:  normal postpartum course  Discharge Assessment: No areas of skin breakdown/redness; surgical incision intact/healing     Follow up:  2 week for incision check.      Discharge Meds:   Tylenol  Oxycodone 5 mg, #30  Rayo Goldman M.D.

## 2023-09-27 NOTE — PROGRESS NOTES
Report received from Danica GALLEGOS this am. Patient is awake and alert. Assumed care. Discussed plan of care. Assessment done. Encouraged ambulation. Will check the patient at intervals. Call light is within reach.     @ 1500: Medicated for pain. Discharge instruction discussed. Emphasized the importance of  screening follow-up test.     @ 1545: Handed to patient her home medications and explained.       @ 1700: Baby's ID band matches with MOB. Both were doing well. Baby is in the car seat.

## 2023-09-27 NOTE — LACTATION NOTE
Lactation Follow Up Visit:    Met with Mar and baby to provide follow up lactation assistance. She reports that she was experiencing chest pain overnight; she discontinued breast feeding and pumping for her infant due to pain. Her nipples are cracked after several extended feedings yesterday evening. Patient reports a desire to continue to provide breast milk for her baby, and she is excited to share that her breasts are feeling more firm today, but she is not interested in attempting a latch at this time.     Double-electric breast pumping initiated with 25 mm flanges. Comfort reported at 15% suction. Advised of goal to reach 20-30% suction, as tolerated. 3mL of transitional milk expressed. Mar is aware of ability to rent pump from [facility], as well as the availability of loaner pumps from her [facility] office.     Sore nipple care reviewed.    Pump part cleaning, maximizing milk production and milk storage handouts provided to patient.     Feeding Plan:    If direct feeding at the breast continues to be too painful, establish a schedule for exclusive breast pumping.   Pump breasts for 15-20 minutes every three hours, with double electric, hospital grade pump.     Cue-based paced bottle feeding of infant, at least once every three hours; first offer expressed breast milk (as available), then follow with DBM (in hospital) or formula (after discharge) as needed to provide volumes indicated by supplemental feeding guidelines (at bedside).    Contact [facility] today for pump loan, and to schedule outpatient lactation appointment.    Patient verbalizes understanding. She denies any questions or concerns about the above feeding plan. She is encouraged to call for additional LC assistance, as needed, throughout the remainder of her stay.

## 2023-09-27 NOTE — DISCHARGE INSTRUCTIONS
PATIENT DISCHARGE EDUCATION INSTRUCTION SHEET    REASONS TO CALL YOUR OBSTETRICIAN  Persistent fever, shaking, chills (Temperature higher than 100.4) may indicate you have an infection  Heavy bleeding: soaking more than 1 pad per hour; Passing clots an egg-sized clot or bigger may mean you have an postpartum hemorrhage  Foul odor from vagina or bad smelling or discolored discharge or blood  Breast infection (Mastitis symptoms); breast pain, chills, fever, redness or red streaks, may feel flu like symptoms  Urinary pain, burning or frequency  Incision that is not healing, increased redness, swelling, tenderness or pain, or any pus from episiotomy or  site may mean you have an infection  Redness, swelling, warmth, or painful to touch in the calf area of your leg may mean you have a blood clot  Severe or intensified depression, thoughts or feelings of wanting to hurt yourself or someone else   Pain in chest, obstructed breathing or shortness of breath (trouble catching your breath) may mean you are having a postpartum complication. Call your provider immediately   Headache that does not get better, even after taking medicine, a bad headache with vision changes or pain in the upper right area of your belly may mean you have high blood pressure or post birth preeclampsia. Call your provider immediately    HAND WASHING  All family and friends should wash their hands:  Before and after holding the baby  Before feeding the baby  After using the restroom or changing the baby's diaper    WOUND CARE  Ask your physician for additional care instructions. In general:   Incision:  May shower and pat incision dry   Keep the incision clean and dry  There should not be any opening or pus from the incision  Continue to walk at home 3 times a day   Do NOT lift anything heavier than your baby (over 10 pounds)  Encourage family to help participate in care of the  to allow rest and mom time to heal  Continue to  "use suzie-bottle until bleeding stops as needed    VAGINAL CARE AND BLEEDING  Nothing inside vagina for 6 weeks:   No sexual intercourse, tampons or douching  Bleeding may continue for 2-4 weeks. Amount and color may vary  Soaking 1 pad or more in an hour for several hours is considered heavy bleeding  Passing large egg sized blood clots can be concerning  If you feel like you have heavy bleeding or are having increasing amount of blood clots call your Obstetrician immediately  If you begin feeling faint upon standing, feeling sick to your stomach, have clammy skin, a really fast heartbeat, have chills, start feeling confused, dizzy, sleepy or weak, or feeling like you're going to faint call your Obstetrician immediately    HYPERTENSION   Preeclampsia or gestational hypertension are types of high blood pressure that only pregnant women can get. It is important for you to be aware of symptoms to seek early intervention and treatment. If you have any of these symptoms immediately call your Obstetrician    Vision changes or blurred vision   Severe headache or pain that is unrelieved with medication and will not go away  Persistent pain in upper abdomen or shoulder   Increased swelling of face, feet, or hands  Difficulty breathing or shortness of breath at rest  Urinating less than usual    URINATION AND BOWEL MOVEMENTS  Eating more fiber (bran cereal, fruits, and vegetables) and drinking plenty of fluids will help to avoid constipation  Urinary frequency and urgency after childbirth is normal  If you experience any urinary pain, burning or frequency call your provider    BIRTH CONTROL  It is possible to become pregnant at any time after delivery and while breastfeeding  Plan to discuss a method of birth control with your physician at your post delivery follow up visit    POSTPARTUM BLUES  During the first few days after birth, you may experience a sense of the \"blues\" which may include impatience, irritability or even " "crying. These feelings come and go quickly. However, as many as 1 in 10 women experience emotional symptoms known as postpartum depression.     POSTPARTUM DEPRESSION    May start as early as the second or third day after delivery or take several weeks or months to develop. Symptoms of \"blues\" are present, but are more intense: Crying spells; loss of appetite; feelings of hopelessness or loss of control; fear of touching the baby; over concern or no concern at all about the baby; little or no concern about your own appearance/caring for yourself; and/or inability to sleep or excessive sleeping. Contact your Obstetrician if you are experiencing any of these symptoms     PREVENTING SHAKEN BABY  If you are angry or stressed, PUT THE BABY IN THE CRIB, step away, take some deep breaths, and wait until you are calm to care for the baby. DO NOT SHAKE THE BABY. You are not alone, call a supporter for help.  [facility] 24/7 crisis call line (034-752-8033) or (1-538.902.9611)  You can also text them, text \"ANSWER\" (182021)  "

## 2024-02-26 ENCOUNTER — HOSPITAL ENCOUNTER (EMERGENCY)
Facility: MEDICAL CENTER | Age: 29
End: 2024-02-26
Attending: STUDENT IN AN ORGANIZED HEALTH CARE EDUCATION/TRAINING PROGRAM
Payer: MEDICAID

## 2024-02-26 VITALS
OXYGEN SATURATION: 98 % | DIASTOLIC BLOOD PRESSURE: 62 MMHG | TEMPERATURE: 98.7 F | SYSTOLIC BLOOD PRESSURE: 106 MMHG | RESPIRATION RATE: 16 BRPM | HEART RATE: 67 BPM | HEIGHT: 67 IN | WEIGHT: 169.31 LBS | BODY MASS INDEX: 26.57 KG/M2

## 2024-02-26 DIAGNOSIS — R22.0 FACIAL SWELLING: ICD-10-CM

## 2024-02-26 DIAGNOSIS — T78.40XA ALLERGIC REACTION, INITIAL ENCOUNTER: ICD-10-CM

## 2024-02-26 DIAGNOSIS — H93.8X3 EAR SWELLING, BILATERAL: ICD-10-CM

## 2024-02-26 PROCEDURE — 99282 EMERGENCY DEPT VISIT SF MDM: CPT | Mod: EDC

## 2024-02-26 RX ORDER — PREDNISONE 20 MG/1
40 TABLET ORAL DAILY
Qty: 6 TABLET | Refills: 0 | Status: SHIPPED | OUTPATIENT
Start: 2024-02-26 | End: 2024-02-29

## 2024-02-26 RX ORDER — FLUTICASONE PROPIONATE 50 MCG
2 SPRAY, SUSPENSION (ML) NASAL
Qty: 5 ML | Refills: 1 | Status: SHIPPED | OUTPATIENT
Start: 2024-02-26

## 2024-02-26 ASSESSMENT — LIFESTYLE VARIABLES
DO YOU DRINK ALCOHOL: NO
TOTAL SCORE: 0
EVER FELT BAD OR GUILTY ABOUT YOUR DRINKING: NO
TOTAL SCORE: 0
CONSUMPTION TOTAL: INCOMPLETE
TOTAL SCORE: 0
HAVE YOU EVER FELT YOU SHOULD CUT DOWN ON YOUR DRINKING: NO
HAVE PEOPLE ANNOYED YOU BY CRITICIZING YOUR DRINKING: NO
EVER HAD A DRINK FIRST THING IN THE MORNING TO STEADY YOUR NERVES TO GET RID OF A HANGOVER: NO
DOES PATIENT WANT TO STOP DRINKING: NO

## 2024-02-26 ASSESSMENT — FIBROSIS 4 INDEX: FIB4 SCORE: 0.77

## 2024-02-26 NOTE — Clinical Note
Mar Orr was seen and treated in our emergency department on 2/26/2024.  She may return to work on 02/27/2024.       If you have any questions or concerns, please don't hesitate to call.      Pepe Abel

## 2024-02-26 NOTE — ED NOTES
"Mar Orr D/C'd.  Discharge instructions including s/s to return to ED provided to pt.    Pt verbalized understanding with no further questions and concerns.    Copy of discharge provided to pt.  Signed copy in chart.    Prescription for flonase and deltasone provided to pt.   Pt ambulates out of department; pt in NAD, awake, alert, interactive and age appropriate.  VS /62   Pulse 67   Temp 37.1 °C (98.7 °F) (Temporal)   Resp 16   Ht 1.702 m (5' 7\")   Wt 76.8 kg (169 lb 5 oz)   SpO2 98%   Breastfeeding No   BMI 26.52 kg/m²       "

## 2024-02-26 NOTE — ED TRIAGE NOTES
"Chief Complaint   Patient presents with    Eye Swelling     To bilateral lower lids. Pt states she woke up this morning with her lower eyelids feeling swollen, and that her ears feel warm and itchy. Pt speaking full sentences, airway intact     Pt walk in for above. Pt states she has a hx of seasonal allergies. Pt did not take benadryl prior to arrival. Pt denies any new products or foods in her life. Pt aox4 gcs 15. Pt speaking full sentences, no signs of distress    /59   Pulse 78   Temp 36.5 °C (97.7 °F) (Temporal)   Resp 18   Ht 1.702 m (5' 7\")   Wt 76.8 kg (169 lb 5 oz)   SpO2 99%   Breastfeeding No   BMI 26.52 kg/m²     "

## 2024-02-26 NOTE — ED PROVIDER NOTES
CHIEF COMPLAINT  Chief Complaint   Patient presents with    Eye Swelling     To bilateral lower lids. Pt states she woke up this morning with her lower eyelids feeling swollen, and that her ears feel warm and itchy. Pt speaking full sentences, airway intact       LIMITATION TO HISTORY   Select: pediatric age    HPI    Nithinissi Jeanette Orr is a 28 y.o. female who presents to the Emergency Department for evaluation of possible the lower right eyelid and left eyelid feels warm and itchy in addition to the lower aspect of both of her ears she reports she feels some physical discomfort from the swelling she reports a history of allergies denies fevers denies pain in her eyes denies throat swelling or difficulty breathing.  She reports allergies to horses    OUTSIDE HISTORIAN(S):  Select: Family    EXTERNAL RECORDS REVIEWED  Select:       PAST MEDICAL HISTORY  Past Medical History:   Diagnosis Date    Asthma     Chronic back pain     Obesity     Pericardial effusion 2023    PONV (postoperative nausea and vomiting)      .    SURGICAL HISTORY  Past Surgical History:   Procedure Laterality Date    PRIMARY C SECTION N/A 2023    Procedure: PRIMARY  SECTION;  Surgeon: Kassandra Goldman M.D.;  Location: [facility];  Service: Labor and Delivery    OTHER N/A 2021    gastric sleeve         FAMILY HISTORY  Family History   Problem Relation Age of Onset    Arthritis Mother     No Known Problems Father     Lung Disease Brother         asthma     Diabetes Maternal Grandfather     Cancer Maternal Grandfather         prostate ca           SOCIAL HISTORY  Social History     Socioeconomic History    Marital status: Single     Spouse name: Not on file    Number of children: Not on file    Years of education: Not on file    Highest education level: Not on file   Occupational History     Comment: works at [employer]    Tobacco Use    Smoking status: Never    Smokeless tobacco: Never   Vaping Use     "Vaping Use: Former   Substance and Sexual Activity    Alcohol use: Not Currently    Drug use: Not Currently    Sexual activity: Yes     Partners: Male   Other Topics Concern    Not on file   Social History Narrative    Not on file     Social Determinants of Health     Financial Resource Strain: Not on file   Food Insecurity: Not on file   Transportation Needs: Not on file   Physical Activity: Not on file   Stress: Not on file   Social Connections: Not on file   Intimate Partner Violence: Not on file   Housing Stability: Not on file         CURRENT MEDICATIONS  No current facility-administered medications on file prior to encounter.     Current Outpatient Medications on File Prior to Encounter   Medication Sig Dispense Refill    docusate sodium 100 MG Cap Take 100 mg by mouth 2 times a day as needed for Constipation. 60 Capsule 1    acetaminophen (TYLENOL) 500 MG Tab Take 2 Tablets by mouth every 6 hours as needed for Mild Pain or Moderate Pain. 60 Tablet 1    albuterol 108 (90 Base) MCG/ACT Aero Soln inhalation aerosol Inhale 2 Puffs every four hours as needed for Shortness of Breath. 8.5 g 0    prenatal plus vitamin (STUARTNATAL 1+1) 27-1 MG Tab tablet Take 1 Tablet by mouth every day.             ALLERGIES  No Known Allergies    PHYSICAL EXAM  VITAL SIGNS:/59   Pulse 78   Temp 36.5 °C (97.7 °F) (Temporal)   Resp 18   Ht 1.702 m (5' 7\")   Wt 76.8 kg (169 lb 5 oz)   SpO2 99%   Breastfeeding No   BMI 26.52 kg/m²     GENERAL: Awake and alert  HEAD: Normocephalic and atraumatic, there is symmetric erythema without warmth or tenderness with small amount of edema bilateral upper cheeks abutting the lower lids bilaterally, there is also erythema and edema without warmth or tenderness of the bilateral pinna of the ears  NECK: Normal range of motion, without meningismus  EYES: Pupils Equal, Round, Reactive to Light, extraocular movements intact, conjunctiva white  ENT: Mucous membranes moist, oropharynx " clear  PULMONARY: Normal effort, clear to auscultation  CARDIOVASCULAR: No murmurs, clicks or rubs, peripheral pulses 2+  ABDOMINAL: Soft, non-tender, no guarding or rigidity present, no pulsatile masses  BACK: no midline tenderness, no costovertebral tenderness  NEUROLOGICAL: Grossly non-focal neurological examination, speech normal, gait normal  EXTREMITIES: No edema, normal to inspection  SKIN: Warm and dry.  PSYCHIATRIC: Affect is appropriate         COURSE & MEDICAL DECISION MAKING    ED COURSE:    Response on recheck:          INITIAL ASSESSMENT, COURSE AND PLAN  Care Narrative:     Patient presenting with signs symptoms consistent with mild allergic angioedema without oral pharyngeal or lip involvement, she had no significant amount of pain, there is no tenderness or warmth of her skin or pinna of her ear so feel infectious process is quite a bit less likely, she develops swelling in her oropharynx, pain or fevers she is instructed to return the emergency department for reevaluation because I discussed with her that antibiotics were considered but not indicated at this time but should she develop fever or worsening pain that antibiotics would likely be indicated     ADDITIONAL PROBLEM LIST    DISPOSITION AND DISCUSSIONS    Discussion of management with other QHP or appropriate source(s): None     Escalation of care considered, and ultimately not performed:blood analysis and diagnostic imaging    Barriers to care at this time, including but not limited to: Prescribed Flonase and prednisone instructed to take cetirizine.       FINAL DIAGNOSIS  1. Facial swelling    2. Ear swelling, bilateral    3. Allergic reaction, initial encounter             Electronically signed by: Pepe Abel DO ,8:14 AM 02/26/24

## 2024-02-26 NOTE — ED NOTES
Pt denies any known allergies and denies any trouble breathing. Pt did not medicate with anything at home prior to arrival.

## (undated) DEVICE — GLOVE BIOGEL SZ 6 PF LATEX - (50EA/BX 4BX/CA)

## (undated) DEVICE — KIT  I.V. START (100EA/CA)

## (undated) DEVICE — WATER IRRIGATION STERILE 1000ML (12EA/CA)

## (undated) DEVICE — CANISTER SUCTION 3000ML MECHANICAL FILTER AUTO SHUTOFF MEDI-VAC NONSTERILE LF DISP  (40EA/CA)

## (undated) DEVICE — PACK C-SECTION (2EA/CA)

## (undated) DEVICE — TUBING CLEARLINK DUO-VENT - C-FLO (48EA/CA)

## (undated) DEVICE — SLEEVE, SEQUENTIAL CALF REG

## (undated) DEVICE — SUTURE 1 CHROMIC CTX ETHICON - (36PK/BX)

## (undated) DEVICE — DRESSING TRANSPARENT FILM TEGADERM 4 X 4.75" (50EA/BX)"

## (undated) DEVICE — TRAY SPINAL ANESTHESIA NON-SAFETY (10/CA)

## (undated) DEVICE — CATHETER IV NON-SAFETY 18 GA X 1 1/4 (50/BX 4BX/CA)

## (undated) DEVICE — CHLORAPREP 26 ML APPLICATOR - ORANGE TINT(25/CA)

## (undated) DEVICE — STAPLER SKIN DISP - (6/BX 10BX/CA) VISISTAT

## (undated) DEVICE — SODIUM CHL IRRIGATION 0.9% 1000ML (12EA/CA)

## (undated) DEVICE — HEAD HOLDER JUNIOR/ADULT

## (undated) DEVICE — TAPE CLOTH MEDIPORE 6 INCH - (12RL/CA)

## (undated) DEVICE — TUBE SUCTION YANKAUER  1/4 X 6FT (20EA/CA)"

## (undated) DEVICE — ELECTRODE DUAL RETURN W/ CORD - (50/PK)

## (undated) DEVICE — SET EXTENSION WITH 2 PORTS (48EA/CA) ***PART #2C8610 IS A SUBSTITUTE*****

## (undated) DEVICE — SUTURE 1 VICRYL PLUS CTX - 36 INCH (36/BX)

## (undated) DEVICE — CLOSURE PRINEO SKIN - (2EA/BX)